# Patient Record
Sex: FEMALE | Race: WHITE | NOT HISPANIC OR LATINO | URBAN - METROPOLITAN AREA
[De-identification: names, ages, dates, MRNs, and addresses within clinical notes are randomized per-mention and may not be internally consistent; named-entity substitution may affect disease eponyms.]

---

## 2018-08-04 ENCOUNTER — EMERGENCY (EMERGENCY)
Facility: HOSPITAL | Age: 73
LOS: 0 days | Discharge: HOME | End: 2018-08-05
Attending: EMERGENCY MEDICINE | Admitting: EMERGENCY MEDICINE
Payer: MEDICARE

## 2018-08-04 VITALS
OXYGEN SATURATION: 100 % | RESPIRATION RATE: 19 BRPM | TEMPERATURE: 97 F | HEART RATE: 65 BPM | DIASTOLIC BLOOD PRESSURE: 78 MMHG | SYSTOLIC BLOOD PRESSURE: 121 MMHG

## 2018-08-04 VITALS
DIASTOLIC BLOOD PRESSURE: 65 MMHG | HEART RATE: 66 BPM | TEMPERATURE: 98 F | SYSTOLIC BLOOD PRESSURE: 127 MMHG | RESPIRATION RATE: 18 BRPM | OXYGEN SATURATION: 100 % | HEIGHT: 64 IN | WEIGHT: 128.97 LBS

## 2018-08-04 DIAGNOSIS — S09.90XA UNSPECIFIED INJURY OF HEAD, INITIAL ENCOUNTER: ICD-10-CM

## 2018-08-04 DIAGNOSIS — R55 SYNCOPE AND COLLAPSE: ICD-10-CM
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
## 2018-08-04 DIAGNOSIS — F10.129 ALCOHOL ABUSE WITH INTOXICATION, UNSPECIFIED: ICD-10-CM
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
## 2018-08-04 LAB
ALBUMIN SERPL ELPH-MCNC: 3.9 G/DL — SIGNIFICANT CHANGE UP (ref 3.3–5)
ALP SERPL-CCNC: 80 U/L — SIGNIFICANT CHANGE UP (ref 40–120)
ALT FLD-CCNC: 23 U/L — SIGNIFICANT CHANGE UP (ref 12–78)
ANION GAP SERPL CALC-SCNC: 8 MMOL/L — SIGNIFICANT CHANGE UP (ref 5–17)
AST SERPL-CCNC: 21 U/L — SIGNIFICANT CHANGE UP (ref 15–37)
BASOPHILS # BLD AUTO: 0.04 K/UL — SIGNIFICANT CHANGE UP (ref 0–0.2)
BASOPHILS NFR BLD AUTO: 0.6 % — SIGNIFICANT CHANGE UP (ref 0–2)
BILIRUB SERPL-MCNC: 0.4 MG/DL — SIGNIFICANT CHANGE UP (ref 0.2–1.2)
BUN SERPL-MCNC: 19 MG/DL — SIGNIFICANT CHANGE UP (ref 7–23)
CALCIUM SERPL-MCNC: 8.4 MG/DL — LOW (ref 8.5–10.1)
CHLORIDE SERPL-SCNC: 107 MMOL/L — SIGNIFICANT CHANGE UP (ref 96–108)
CK SERPL-CCNC: 109 U/L — SIGNIFICANT CHANGE UP (ref 26–192)
CO2 SERPL-SCNC: 27 MMOL/L — SIGNIFICANT CHANGE UP (ref 22–31)
CREAT SERPL-MCNC: 0.8 MG/DL — SIGNIFICANT CHANGE UP (ref 0.5–1.3)
EOSINOPHIL # BLD AUTO: 0.32 K/UL — SIGNIFICANT CHANGE UP (ref 0–0.5)
EOSINOPHIL NFR BLD AUTO: 4.5 % — SIGNIFICANT CHANGE UP (ref 0–6)
ETHANOL SERPL-MCNC: 196 MG/DL — HIGH (ref 0–10)
GLUCOSE SERPL-MCNC: 98 MG/DL — SIGNIFICANT CHANGE UP (ref 70–99)
HCT VFR BLD CALC: 40.1 % — SIGNIFICANT CHANGE UP (ref 34.5–45)
HGB BLD-MCNC: 13.6 G/DL — SIGNIFICANT CHANGE UP (ref 11.5–15.5)
IMM GRANULOCYTES NFR BLD AUTO: 0.6 % — SIGNIFICANT CHANGE UP (ref 0–1.5)
LYMPHOCYTES # BLD AUTO: 2.28 K/UL — SIGNIFICANT CHANGE UP (ref 1–3.3)
LYMPHOCYTES # BLD AUTO: 31.8 % — SIGNIFICANT CHANGE UP (ref 13–44)
MAGNESIUM SERPL-MCNC: 2.3 MG/DL — SIGNIFICANT CHANGE UP (ref 1.6–2.6)
MCHC RBC-ENTMCNC: 31.9 PG — SIGNIFICANT CHANGE UP (ref 27–34)
MCHC RBC-ENTMCNC: 33.9 GM/DL — SIGNIFICANT CHANGE UP (ref 32–36)
MCV RBC AUTO: 93.9 FL — SIGNIFICANT CHANGE UP (ref 80–100)
MONOCYTES # BLD AUTO: 0.61 K/UL — SIGNIFICANT CHANGE UP (ref 0–0.9)
MONOCYTES NFR BLD AUTO: 8.5 % — SIGNIFICANT CHANGE UP (ref 2–14)
NEUTROPHILS # BLD AUTO: 3.89 K/UL — SIGNIFICANT CHANGE UP (ref 1.8–7.4)
NEUTROPHILS NFR BLD AUTO: 54 % — SIGNIFICANT CHANGE UP (ref 43–77)
NRBC # BLD: 0 /100 WBCS — SIGNIFICANT CHANGE UP (ref 0–0)
PLATELET # BLD AUTO: 215 K/UL — SIGNIFICANT CHANGE UP (ref 150–400)
POTASSIUM SERPL-MCNC: 3.7 MMOL/L — SIGNIFICANT CHANGE UP (ref 3.5–5.3)
POTASSIUM SERPL-SCNC: 3.7 MMOL/L — SIGNIFICANT CHANGE UP (ref 3.5–5.3)
PROT SERPL-MCNC: 6.8 GM/DL — SIGNIFICANT CHANGE UP (ref 6–8.3)
RBC # BLD: 4.27 M/UL — SIGNIFICANT CHANGE UP (ref 3.8–5.2)
RBC # FLD: 12.7 % — SIGNIFICANT CHANGE UP (ref 10.3–14.5)
SODIUM SERPL-SCNC: 142 MMOL/L — SIGNIFICANT CHANGE UP (ref 135–145)
TROPONIN I SERPL-MCNC: <0.015 NG/ML — SIGNIFICANT CHANGE UP (ref 0.01–0.04)
TROPONIN I SERPL-MCNC: <0.015 NG/ML — SIGNIFICANT CHANGE UP (ref 0.01–0.04)
WBC # BLD: 7.18 K/UL — SIGNIFICANT CHANGE UP (ref 3.8–10.5)
WBC # FLD AUTO: 7.18 K/UL — SIGNIFICANT CHANGE UP (ref 3.8–10.5)

## 2018-08-04 PROCEDURE — 73000 X-RAY EXAM OF COLLAR BONE: CPT | Mod: 26,LT

## 2018-08-04 PROCEDURE — 93010 ELECTROCARDIOGRAM REPORT: CPT

## 2018-08-04 PROCEDURE — 99284 EMERGENCY DEPT VISIT MOD MDM: CPT

## 2018-08-04 PROCEDURE — 71045 X-RAY EXAM CHEST 1 VIEW: CPT | Mod: 26

## 2018-08-04 PROCEDURE — 73080 X-RAY EXAM OF ELBOW: CPT | Mod: 26,LT

## 2018-08-04 PROCEDURE — 73502 X-RAY EXAM HIP UNI 2-3 VIEWS: CPT | Mod: 26

## 2018-08-04 PROCEDURE — 70450 CT HEAD/BRAIN W/O DYE: CPT | Mod: 26

## 2018-08-04 RX ORDER — BACITRACIN ZINC 500 UNIT/G
1 OINTMENT IN PACKET (EA) TOPICAL ONCE
Qty: 0 | Refills: 0 | Status: COMPLETED | OUTPATIENT
Start: 2018-08-04 | End: 2018-08-04

## 2018-08-04 RX ORDER — SODIUM CHLORIDE 9 MG/ML
1000 INJECTION, SOLUTION INTRAVENOUS
Qty: 0 | Refills: 0 | Status: COMPLETED | OUTPATIENT
Start: 2018-08-04 | End: 2018-08-04

## 2018-08-04 RX ADMIN — Medication 1 APPLICATION(S): at 20:31

## 2018-08-04 RX ADMIN — SODIUM CHLORIDE 125 MILLILITER(S): 9 INJECTION, SOLUTION INTRAVENOUS at 22:01

## 2018-08-04 NOTE — ED PROVIDER NOTE - PROGRESS NOTE DETAILS
Lon Leyva for attending Dr. Walker: Pt c/o  pain over left collar bone. Chest XR is normal. Distal end of clavicle if not visualized. Plan to order dedicated XR of clavicle.

## 2018-08-04 NOTE — ED ADULT NURSE NOTE - CHPI ED NUR SYMPTOMS NEG
no decreased eating/drinking/no vomiting/no dizziness/no fever/no nausea/no weakness/no tingling/no chills

## 2018-08-04 NOTE — ED PROVIDER NOTE - MEDICAL DECISION MAKING DETAILS
74 y/o female c/o alcohol intoxication with syncope. Plan: labs, including 2 troponin, will get CT of head, XR of elbow, pelvis, hip. Bacitracin applied to elbow. Banana bag will be given for IV hydration.

## 2018-08-04 NOTE — ED ADULT TRIAGE NOTE - CHIEF COMPLAINT QUOTE
Patient states that she was at a party and had a "few drinks." Reports that while walking inside she "passed out." Reports mild pain to right hip, otherwise no complaints.

## 2018-08-04 NOTE — ED ADULT NURSE NOTE - OBJECTIVE STATEMENT
pt presented to ED for syncope. As per daughter at the bedside, pt was drinking at the party, was walking up the steps, then fell back drew. No recollection of falling down. C/O neck, left shoulder, and hip pain upon arrival.

## 2018-08-04 NOTE — ED PROVIDER NOTE - OBJECTIVE STATEMENT
74 y/o female with no relevant PMHx presents to the ED s/p syncope. As per family member, pt was drinking at a family party, was walking up the steps and fell backwards. Does not remember falling. +neck pain. +head injury. Denies n/v/d, HA, back pain, numbness and tingling.

## 2018-08-04 NOTE — ED ADULT NURSE NOTE - NSIMPLEMENTINTERV_GEN_ALL_ED
Implemented All Universal Safety Interventions:  [place] to call system. Call bell, personal items and telephone within reach. Instruct patient to call for assistance. Room bathroom lighting operational. Non-slip footwear when patient is off stretcher. Physically safe environment: no spills, clutter or unnecessary equipment. Stretcher in lowest position, wheels locked, appropriate side rails in place.

## 2018-08-04 NOTE — ED PROVIDER NOTE - CARE PLAN
Principal Discharge DX:	Syncope and collapse  Secondary Diagnosis:	Closed head injury, initial encounter  Secondary Diagnosis:	Alcoholic intoxication without complication

## 2018-10-03 PROBLEM — Z00.00 ENCOUNTER FOR PREVENTIVE HEALTH EXAMINATION: Status: ACTIVE | Noted: 2018-10-03

## 2018-10-05 ENCOUNTER — APPOINTMENT (OUTPATIENT)
Dept: ORTHOPEDIC SURGERY | Facility: CLINIC | Age: 73
End: 2018-10-05
Payer: MEDICARE

## 2018-10-05 VITALS
HEIGHT: 64 IN | HEART RATE: 69 BPM | BODY MASS INDEX: 23.05 KG/M2 | DIASTOLIC BLOOD PRESSURE: 78 MMHG | SYSTOLIC BLOOD PRESSURE: 183 MMHG | WEIGHT: 135 LBS

## 2018-10-05 DIAGNOSIS — Z78.9 OTHER SPECIFIED HEALTH STATUS: ICD-10-CM

## 2018-10-05 DIAGNOSIS — M47.816 SPONDYLOSIS W/OUT MYELOPATHY OR RADICULOPATHY, LUMBAR REGION: ICD-10-CM

## 2018-10-05 DIAGNOSIS — F17.200 NICOTINE DEPENDENCE, UNSPECIFIED, UNCOMPLICATED: ICD-10-CM

## 2018-10-05 DIAGNOSIS — M16.11 UNILATERAL PRIMARY OSTEOARTHRITIS, RIGHT HIP: ICD-10-CM

## 2018-10-05 PROCEDURE — 72100 X-RAY EXAM L-S SPINE 2/3 VWS: CPT

## 2018-10-05 PROCEDURE — 73502 X-RAY EXAM HIP UNI 2-3 VIEWS: CPT | Mod: RT

## 2018-10-05 PROCEDURE — 99205 OFFICE O/P NEW HI 60 MIN: CPT

## 2018-10-16 PROBLEM — M47.816 DJD (DEGENERATIVE JOINT DISEASE), LUMBAR: Status: ACTIVE | Noted: 2018-10-16

## 2018-10-16 PROBLEM — F17.200 CURRENT EVERY DAY SMOKER: Status: ACTIVE | Noted: 2018-10-05

## 2018-10-16 PROBLEM — Z78.9 EXERCISES OCCASIONALLY: Status: ACTIVE | Noted: 2018-10-05

## 2018-10-16 PROBLEM — Z78.9 ALCOHOL USE: Status: ACTIVE | Noted: 2018-10-05

## 2018-10-16 PROBLEM — M16.11 PRIMARY LOCALIZED OSTEOARTHRITIS OF RIGHT HIP: Status: ACTIVE | Noted: 2018-10-16

## 2018-10-16 RX ORDER — MULTIVIT-MIN/IRON/FOLIC ACID/K 18-600-40
CAPSULE ORAL
Refills: 0 | Status: ACTIVE | COMMUNITY

## 2019-02-16 ENCOUNTER — EMERGENCY (EMERGENCY)
Facility: HOSPITAL | Age: 74
LOS: 0 days | Discharge: HOME | End: 2019-02-16
Attending: STUDENT IN AN ORGANIZED HEALTH CARE EDUCATION/TRAINING PROGRAM | Admitting: STUDENT IN AN ORGANIZED HEALTH CARE EDUCATION/TRAINING PROGRAM
Payer: MEDICARE

## 2019-02-16 VITALS
OXYGEN SATURATION: 100 % | SYSTOLIC BLOOD PRESSURE: 150 MMHG | TEMPERATURE: 99 F | DIASTOLIC BLOOD PRESSURE: 77 MMHG | RESPIRATION RATE: 15 BRPM | HEART RATE: 64 BPM

## 2019-02-16 VITALS — HEIGHT: 64 IN | WEIGHT: 134.92 LBS

## 2019-02-16 DIAGNOSIS — S52.502A UNSPECIFIED FRACTURE OF THE LOWER END OF LEFT RADIUS, INITIAL ENCOUNTER FOR CLOSED FRACTURE: ICD-10-CM
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
## 2019-02-16 DIAGNOSIS — Z88.0 ALLERGY STATUS TO PENICILLIN: ICD-10-CM

## 2019-02-16 DIAGNOSIS — M25.532 PAIN IN LEFT WRIST: ICD-10-CM

## 2019-02-16 PROCEDURE — 99283 EMERGENCY DEPT VISIT LOW MDM: CPT | Mod: 25

## 2019-02-16 PROCEDURE — 73110 X-RAY EXAM OF WRIST: CPT | Mod: 26,LT

## 2019-02-16 NOTE — ED ADULT NURSE NOTE - OBJECTIVE STATEMENT
c/o left arm pain, s/p left arm fracture and was reset 3 weeks ago while pt had repair of right femur fracture, left arm in cast HX: 2 falls,

## 2019-02-16 NOTE — ED PROVIDER NOTE - OBJECTIVE STATEMENT
73 y/o female with no pertinent PMHx presents to the ED s/p mechanical fall on January 21st c/o left arm pain. Had right hip replacement at [facility] on January 14th and went home the next day with a walker. A few days after the surgery, pt was holding a shower rail as she entered the bathtub when the rail broke and fell cracking her femur. Pt then fell again in the kitchen on January 21st and broke her left arm. Pt's son in law spoke with Dr. Schrader, who told the pt to come to the hospital and Dr. Schrader will come and see her. 75 y/o female with no pertinent PMHx presents to the ED s/p mechanical fall on January 21st c/o left arm pain. Had right hip replacement at [facility] on January 14th and went home the next day with a walker. A few days after the surgery, pt was holding a shower rail as she entered the bathtub when the rail broke and she fell, cracking her femur. Pt then fell again in the kitchen on January 21st and broke her left arm. Pt's son in law spoke with Dr. Schrader, who told the pt to come to the hospital and Dr. Schrader will come and see her.

## 2019-02-16 NOTE — ED PROVIDER NOTE - MUSCULOSKELETAL, MLM
Spine appears normal, range of motion is not limited, no muscle or joint tenderness. +LUE in splint, good cap refill.

## 2019-02-16 NOTE — ED ADULT TRIAGE NOTE - CHIEF COMPLAINT QUOTE
patient brought in by  to meet Dr. Rowland. patient had left arm injury 10 days ago and is having pain to arm today. arm in splint.

## 2021-05-10 ENCOUNTER — APPOINTMENT (OUTPATIENT)
Dept: CT IMAGING | Facility: CLINIC | Age: 76
End: 2021-05-10
Payer: MEDICARE

## 2021-05-10 ENCOUNTER — TRANSCRIPTION ENCOUNTER (OUTPATIENT)
Age: 76
End: 2021-05-10

## 2021-05-10 ENCOUNTER — OUTPATIENT (OUTPATIENT)
Dept: OUTPATIENT SERVICES | Facility: HOSPITAL | Age: 76
LOS: 1 days | End: 2021-05-10
Payer: MEDICARE

## 2021-05-10 DIAGNOSIS — S09.90XA UNSPECIFIED INJURY OF HEAD, INITIAL ENCOUNTER: ICD-10-CM

## 2021-05-10 PROCEDURE — 70450 CT HEAD/BRAIN W/O DYE: CPT | Mod: 26

## 2021-05-10 PROCEDURE — 70450 CT HEAD/BRAIN W/O DYE: CPT

## 2021-08-14 ENCOUNTER — TRANSCRIPTION ENCOUNTER (OUTPATIENT)
Age: 76
End: 2021-08-14

## 2021-08-16 ENCOUNTER — APPOINTMENT (OUTPATIENT)
Dept: DERMATOLOGY | Facility: CLINIC | Age: 76
End: 2021-08-16
Payer: MEDICARE

## 2021-08-16 ENCOUNTER — NON-APPOINTMENT (OUTPATIENT)
Age: 76
End: 2021-08-16

## 2021-08-16 PROCEDURE — 99203 OFFICE O/P NEW LOW 30 MIN: CPT

## 2021-08-16 RX ORDER — SILVER SULFADIAZINE 10 MG/G
1 CREAM TOPICAL
Qty: 1 | Refills: 3 | Status: ACTIVE | COMMUNITY
Start: 2021-08-16 | End: 1900-01-01

## 2021-08-16 RX ORDER — MULTIVIT-MIN/FA/LYCOPEN/LUTEIN .4-300-25
TABLET ORAL
Refills: 0 | Status: ACTIVE | COMMUNITY
Start: 2021-08-16

## 2021-08-16 RX ORDER — ANTIARTHRITIC COMBINATION NO.2 900 MG
5000 TABLET ORAL
Refills: 0 | Status: ACTIVE | COMMUNITY
Start: 2021-08-16

## 2021-08-16 RX ORDER — CYANOCOBALAMIN (VITAMIN B-12) 100 MCG
100 TABLET ORAL
Refills: 0 | Status: ACTIVE | COMMUNITY
Start: 2021-08-16

## 2021-08-16 RX ORDER — SERTRALINE HYDROCHLORIDE 100 MG/1
100 TABLET, FILM COATED ORAL
Refills: 0 | Status: ACTIVE | COMMUNITY
Start: 2021-08-16

## 2021-08-16 NOTE — ASSESSMENT
[FreeTextEntry1] : Erosions from external stimuli - ? etiology\par Silvadene cream bid, with dressings.\par Discussed at length with patient.\par f/u in 2 weeks.

## 2021-08-16 NOTE — PHYSICAL EXAM
[Alert] : alert [Oriented x 3] : ~L oriented x 3 [Well Nourished] : well nourished [FreeTextEntry3] : Triangular and square erosions on the right extensor forearm.  Very superficial abrasion of the left knee.

## 2021-08-16 NOTE — HISTORY OF PRESENT ILLNESS
[FreeTextEntry1] : Ulcers of the right forearm, left knee. [de-identified] : Awoke this AM with lesions, and patient sure that it wasn't there last PM.  Denies burn.

## 2021-08-30 ENCOUNTER — APPOINTMENT (OUTPATIENT)
Dept: DERMATOLOGY | Facility: CLINIC | Age: 76
End: 2021-08-30

## 2021-09-02 ENCOUNTER — APPOINTMENT (OUTPATIENT)
Dept: DERMATOLOGY | Facility: CLINIC | Age: 76
End: 2021-09-02
Payer: MEDICARE

## 2021-09-02 DIAGNOSIS — L81.0 POSTINFLAMMATORY HYPERPIGMENTATION: ICD-10-CM

## 2021-09-02 DIAGNOSIS — L98.1 FACTITIAL DERMATITIS: ICD-10-CM

## 2021-09-02 PROCEDURE — 99213 OFFICE O/P EST LOW 20 MIN: CPT

## 2021-09-02 NOTE — HISTORY OF PRESENT ILLNESS
[FreeTextEntry1] : Check the right forearm. [de-identified] : ulcer has healed s/p tx with silvadene cream.\par Patient also with dark lesions of the face, s/p fall.

## 2021-09-02 NOTE — ASSESSMENT
[FreeTextEntry1] : Cicatrix\par message with thick emollient creams.\par Education.\par \par Lentigines / P.I. hyperpigmentation.\par Discussed IPL tx if desires.\par Risks/benefits discussed.\par Cosmetic at [facility]/tx for 1-3+ txs as desired.

## 2021-09-02 NOTE — PHYSICAL EXAM
[Alert] : alert [Oriented x 3] : ~L oriented x 3 [Well Nourished] : well nourished [FreeTextEntry3] : Erythematous geometric cicatrix, left forearm.\par \par lentigines, face.\par P.I. hyperpigmentation of the nose, left malar.

## 2021-10-01 ENCOUNTER — APPOINTMENT (OUTPATIENT)
Dept: NEUROLOGY | Facility: CLINIC | Age: 76
End: 2021-10-01
Payer: MEDICARE

## 2021-10-01 PROCEDURE — 96132 NRPSYC TST EVAL PHYS/QHP 1ST: CPT

## 2021-10-01 PROCEDURE — 96116 NUBHVL XM PHYS/QHP 1ST HR: CPT

## 2021-10-01 PROCEDURE — 96121 NUBHVL XM PHY/QHP EA ADDL HR: CPT

## 2021-10-08 ENCOUNTER — APPOINTMENT (OUTPATIENT)
Dept: NEUROLOGY | Facility: CLINIC | Age: 76
End: 2021-10-08
Payer: MEDICARE

## 2021-10-08 PROCEDURE — 96138 PSYCL/NRPSYC TECH 1ST: CPT

## 2021-10-08 PROCEDURE — 96133 NRPSYC TST EVAL PHYS/QHP EA: CPT

## 2021-10-08 PROCEDURE — 96139 PSYCL/NRPSYC TST TECH EA: CPT

## 2021-10-19 ENCOUNTER — APPOINTMENT (OUTPATIENT)
Dept: NEUROLOGY | Facility: CLINIC | Age: 76
End: 2021-10-19
Payer: MEDICARE

## 2021-10-19 PROCEDURE — 96139 PSYCL/NRPSYC TST TECH EA: CPT

## 2021-10-19 PROCEDURE — 96133 NRPSYC TST EVAL PHYS/QHP EA: CPT

## 2021-11-03 ENCOUNTER — APPOINTMENT (OUTPATIENT)
Dept: NEUROLOGY | Facility: CLINIC | Age: 76
End: 2021-11-03
Payer: MEDICARE

## 2021-11-03 PROCEDURE — 96132 NRPSYC TST EVAL PHYS/QHP 1ST: CPT | Mod: 95

## 2022-11-17 ENCOUNTER — APPOINTMENT (OUTPATIENT)
Dept: DERMATOLOGY | Facility: CLINIC | Age: 77
End: 2022-11-17

## 2022-11-17 DIAGNOSIS — L90.5 SCAR CONDITIONS AND FIBROSIS OF SKIN: ICD-10-CM

## 2022-11-17 PROCEDURE — 99213 OFFICE O/P EST LOW 20 MIN: CPT

## 2022-11-17 RX ORDER — MUPIROCIN 20 MG/G
2 OINTMENT TOPICAL
Qty: 22 | Refills: 0 | Status: ACTIVE | COMMUNITY
Start: 2022-10-17

## 2022-11-17 RX ORDER — SODIUM SULFATE, POTASSIUM SULFATE, MAGNESIUM SULFATE 17.5; 3.13; 1.6 G/ML; G/ML; G/ML
17.5-3.13-1.6 SOLUTION, CONCENTRATE ORAL
Qty: 354 | Refills: 0 | Status: ACTIVE | COMMUNITY
Start: 2022-06-01

## 2022-11-17 RX ORDER — AMLODIPINE BESYLATE AND BENAZEPRIL HYDROCHLORIDE 5; 10 MG/1; MG/1
5-10 CAPSULE ORAL
Refills: 0 | Status: DISCONTINUED | COMMUNITY
Start: 2021-08-16 | End: 2022-11-17

## 2022-11-17 RX ORDER — DONEPEZIL HYDROCHLORIDE 10 MG/1
10 TABLET ORAL
Qty: 90 | Refills: 0 | Status: ACTIVE | COMMUNITY
Start: 2022-01-25

## 2022-11-17 RX ORDER — SIMVASTATIN 10 MG/1
10 TABLET, FILM COATED ORAL
Refills: 0 | Status: DISCONTINUED | COMMUNITY
Start: 2021-08-16 | End: 2022-11-17

## 2022-11-17 RX ORDER — OLMESARTAN MEDOXOMIL, AMLODIPINE BESYLATE AND HYDROCHLOROTHIAZIDE 40; 10; 12.5 MG/1; MG/1; MG/1
40-5-25 TABLET, FILM COATED ORAL
Qty: 30 | Refills: 0 | Status: ACTIVE | COMMUNITY
Start: 2022-01-19

## 2022-11-17 RX ORDER — METHYLPREDNISOLONE 4 MG/1
4 TABLET ORAL
Qty: 21 | Refills: 0 | Status: DISCONTINUED | COMMUNITY
Start: 2022-05-20

## 2022-11-17 RX ORDER — SERTRALINE HYDROCHLORIDE 50 MG/1
50 TABLET, FILM COATED ORAL
Refills: 0 | Status: DISCONTINUED | COMMUNITY
Start: 2021-08-16 | End: 2022-11-17

## 2022-11-17 RX ORDER — DOXYCYCLINE HYCLATE 100 MG/1
100 TABLET ORAL
Qty: 14 | Refills: 0 | Status: DISCONTINUED | COMMUNITY
Start: 2022-05-20

## 2022-11-17 RX ORDER — AMOXICILLIN 500 MG/1
500 CAPSULE ORAL
Qty: 28 | Refills: 0 | Status: DISCONTINUED | COMMUNITY
Start: 2021-08-27 | End: 2022-11-17

## 2022-11-17 RX ORDER — ROSUVASTATIN CALCIUM 10 MG/1
10 TABLET, FILM COATED ORAL
Qty: 90 | Refills: 0 | Status: ACTIVE | COMMUNITY
Start: 2022-08-11

## 2022-11-17 NOTE — PHYSICAL EXAM
[Alert] : alert [Oriented x 3] : ~L oriented x 3 [Well Nourished] : well nourished [FreeTextEntry3] : Erythematous indurated patch of the forehead, with depigmented indurated patch with peripheral erythema, nasal bridge.

## 2022-11-17 NOTE — HISTORY OF PRESENT ILLNESS
[FreeTextEntry1] : Marks on the face. [de-identified] : Patient fell approx. 2 months ago, with injury to the nose and forehead.\par She had x-rays and w/u at hospital at the time of injury.

## 2022-11-17 NOTE — ASSESSMENT
[FreeTextEntry1] : Cicatrix\par Education.\par Mederma cream qid.\par Message to the region discussed.\par f/u prn.

## 2023-01-24 ENCOUNTER — APPOINTMENT (OUTPATIENT)
Dept: DERMATOLOGY | Facility: CLINIC | Age: 78
End: 2023-01-24
Payer: MEDICARE

## 2023-01-24 DIAGNOSIS — L72.3 SEBACEOUS CYST: ICD-10-CM

## 2023-01-24 PROCEDURE — 99214 OFFICE O/P EST MOD 30 MIN: CPT

## 2023-01-24 NOTE — PHYSICAL EXAM
[FreeTextEntry3] : Mobile subcutaneous nodule with normal overlying skin and a connecting pore \par + evidence of recent rupture; \par mid posterior scalp

## 2023-01-24 NOTE — HISTORY OF PRESENT ILLNESS
[de-identified] : The patient has been fit in for urgent appointment.\par c/o painful lesion on scalp;  oozed, turned red; \par

## 2023-01-24 NOTE — ASSESSMENT
[FreeTextEntry1] : ruptured pilar cyst; \par \par Therapeutic options and their risks and benefits; along with multiple diagnostic possibilities were discussed at length; risks and benefits of further study were discussed;\par \par f/u for surgical excision\par \par The risks and benefits of the procedure, were explained at length including pain; scar, bleeding; recurrence of lesion(s); need for wound care;\par \par

## 2023-02-07 ENCOUNTER — APPOINTMENT (OUTPATIENT)
Dept: DERMATOLOGY | Facility: CLINIC | Age: 78
End: 2023-02-07

## 2023-04-26 ENCOUNTER — APPOINTMENT (OUTPATIENT)
Dept: DERMATOLOGY | Facility: CLINIC | Age: 78
End: 2023-04-26
Payer: MEDICARE

## 2023-04-26 DIAGNOSIS — D48.5 NEOPLASM OF UNCERTAIN BEHAVIOR OF SKIN: ICD-10-CM

## 2023-04-26 PROCEDURE — 12032 INTMD RPR S/A/T/EXT 2.6-7.5: CPT

## 2023-04-26 PROCEDURE — 11403 EXC TR-EXT B9+MARG 2.1-3CM: CPT

## 2023-04-26 NOTE — PROCEDURE
[___ cm] : [unfilled] cm [___ ml of 1% lidocaine w/ 1:100,000 epinephrine] : [unfilled] ml of 1% lidocaine with 1:100,000 epinephrine [Pressure] : pressure [Suture tie off of vessels] : suture tie off of vessels [____ cm] : [unfilled] cm [4-0] : 4-0 Prolene [____ days] : [unfilled] days [FreeTextEntry7] : Left posterior scalp [TWNoteComboBox1] : Epidermal Inclusion Cyst [TWNoteComboBox4] : Epidermal Inclusion Cyst [TWNoteComboBox5] : Subcutaneous fat

## 2023-05-03 ENCOUNTER — APPOINTMENT (OUTPATIENT)
Dept: DERMATOLOGY | Facility: CLINIC | Age: 78
End: 2023-05-03
Payer: MEDICARE

## 2023-05-03 PROCEDURE — 99024 POSTOP FOLLOW-UP VISIT: CPT

## 2023-08-31 ENCOUNTER — APPOINTMENT (OUTPATIENT)
Dept: DERMATOLOGY | Facility: CLINIC | Age: 78
End: 2023-08-31
Payer: MEDICARE

## 2023-08-31 DIAGNOSIS — L81.4 OTHER MELANIN HYPERPIGMENTATION: ICD-10-CM

## 2023-08-31 DIAGNOSIS — L82.1 OTHER SEBORRHEIC KERATOSIS: ICD-10-CM

## 2023-08-31 DIAGNOSIS — Z12.83 ENCOUNTER FOR SCREENING FOR MALIGNANT NEOPLASM OF SKIN: ICD-10-CM

## 2023-08-31 PROCEDURE — 99213 OFFICE O/P EST LOW 20 MIN: CPT

## 2023-08-31 NOTE — HISTORY OF PRESENT ILLNESS
[FreeTextEntry1] : spots [de-identified] : RENATO GÓMEZ is a 78 year old F who present for f/u as below.   FBSE.  Spots scattered on body x years. Asymptomatic and unchanged. No alleviating/aggravating factors. Never been treated. No other changing or concerning lesions.  No itchy, growing, bleeding, painful or changing moles.   Personal hx of skin cancer: no FHx of skin cancer: no Social hx:  to Josh Gómez

## 2023-08-31 NOTE — PHYSICAL EXAM
[Alert] : alert [Oriented x 3] : ~L oriented x 3 [Well Nourished] : well nourished [Conjunctiva Non-injected] : conjunctiva non-injected [No Visual Lymphadenopathy] : no visual  lymphadenopathy [No Clubbing] : no clubbing [No Edema] : no edema [No Bromhidrosis] : no bromhidrosis [No Chromhidrosis] : no chromhidrosis [Full Body Skin Exam Performed] : performed [FreeTextEntry3] : General: Alert and oriented, in NAD.  All of the following were examined and were within normal limits, except as noted:   Scalp: Face, including eyelids, nose, lips, ears, oropharynx: Neck: Chest/Back/Abdomen: Bilateral Arms/Hands: Bilateral Legs/Feet: Hair, Nails, Oral Mucosa, Eyes:   Pt declined genital exam - lentigines - stuckon waxy brown papules - spider veins thighs

## 2023-08-31 NOTE — ASSESSMENT
[FreeTextEntry1] : Screening exam for skin cancer - no suspicious lesions on exam today - TBSE performed today except genitalia - Advised sun protection.  Recommended OTC sunscreen products (EltaMD/Neutrogena/La Roche Posay), including SPF30+ with broadband UV protection as well as proper use.  Discussed OTC sun protective clothing - Counseled patient to monitor for changes, including mole monitoring and self-skin exams  Seborrheic Keratosis - These growths are benign - Related to genetics - these lesions run in families; NOT related to sun exposure - No treatment warranted unless inflamed; can use OTC Sarna lotion PRN itch  Solar lentigines - Discussed etiology and benign nature of condition - Sun protective measures reinforced. Recommended OTC sunscreen products, including SPF30+ with broadband UV protection as well as proper use.

## 2024-01-03 ENCOUNTER — NON-APPOINTMENT (OUTPATIENT)
Age: 79
End: 2024-01-03

## 2024-12-03 ENCOUNTER — APPOINTMENT (OUTPATIENT)
Dept: DERMATOLOGY | Facility: CLINIC | Age: 79
End: 2024-12-03

## 2024-12-25 PROBLEM — F10.90 ALCOHOL USE: Status: ACTIVE | Noted: 2018-10-05

## 2025-01-24 ENCOUNTER — INPATIENT (INPATIENT)
Facility: HOSPITAL | Age: 80
LOS: 5 days | Discharge: NURSING, REHAB OR LONG-TERM CARE | DRG: 552 | End: 2025-01-30
Attending: FAMILY MEDICINE | Admitting: STUDENT IN AN ORGANIZED HEALTH CARE EDUCATION/TRAINING PROGRAM
Payer: MEDICARE

## 2025-01-24 VITALS
HEIGHT: 63 IN | HEART RATE: 79 BPM | RESPIRATION RATE: 16 BRPM | WEIGHT: 139.99 LBS | OXYGEN SATURATION: 99 % | SYSTOLIC BLOOD PRESSURE: 125 MMHG | DIASTOLIC BLOOD PRESSURE: 55 MMHG | TEMPERATURE: 98 F

## 2025-01-24 DIAGNOSIS — R55 SYNCOPE AND COLLAPSE: ICD-10-CM

## 2025-01-24 LAB
ALBUMIN SERPL ELPH-MCNC: 3.6 G/DL — SIGNIFICANT CHANGE UP (ref 3.3–5)
ALP SERPL-CCNC: 84 U/L — SIGNIFICANT CHANGE UP (ref 40–120)
ALT FLD-CCNC: 18 U/L — SIGNIFICANT CHANGE UP (ref 12–78)
ANION GAP SERPL CALC-SCNC: 6 MMOL/L — SIGNIFICANT CHANGE UP (ref 5–17)
AST SERPL-CCNC: 29 U/L — SIGNIFICANT CHANGE UP (ref 15–37)
BASOPHILS # BLD AUTO: 0.03 K/UL — SIGNIFICANT CHANGE UP (ref 0–0.2)
BASOPHILS NFR BLD AUTO: 0.2 % — SIGNIFICANT CHANGE UP (ref 0–2)
BILIRUB SERPL-MCNC: 0.7 MG/DL — SIGNIFICANT CHANGE UP (ref 0.2–1.2)
BUN SERPL-MCNC: 51 MG/DL — HIGH (ref 7–23)
CALCIUM SERPL-MCNC: 9.5 MG/DL — SIGNIFICANT CHANGE UP (ref 8.5–10.1)
CHLORIDE SERPL-SCNC: 102 MMOL/L — SIGNIFICANT CHANGE UP (ref 96–108)
CO2 SERPL-SCNC: 25 MMOL/L — SIGNIFICANT CHANGE UP (ref 22–31)
CREAT SERPL-MCNC: 1.87 MG/DL — HIGH (ref 0.5–1.3)
EGFR: 27 ML/MIN/1.73M2 — LOW
EOSINOPHIL # BLD AUTO: 0.03 K/UL — SIGNIFICANT CHANGE UP (ref 0–0.5)
EOSINOPHIL NFR BLD AUTO: 0.2 % — SIGNIFICANT CHANGE UP (ref 0–6)
ETHANOL SERPL-MCNC: <10 MG/DL — SIGNIFICANT CHANGE UP (ref 0–10)
FLUAV AG NPH QL: SIGNIFICANT CHANGE UP
FLUBV AG NPH QL: SIGNIFICANT CHANGE UP
GLUCOSE SERPL-MCNC: 128 MG/DL — HIGH (ref 70–99)
HCT VFR BLD CALC: 35.7 % — SIGNIFICANT CHANGE UP (ref 34.5–45)
HGB BLD-MCNC: 12.6 G/DL — SIGNIFICANT CHANGE UP (ref 11.5–15.5)
IMM GRANULOCYTES NFR BLD AUTO: 1.4 % — HIGH (ref 0–0.9)
LACTATE SERPL-SCNC: 1.3 MMOL/L — SIGNIFICANT CHANGE UP (ref 0.7–2)
LYMPHOCYTES # BLD AUTO: 1.08 K/UL — SIGNIFICANT CHANGE UP (ref 1–3.3)
LYMPHOCYTES # BLD AUTO: 8.6 % — LOW (ref 13–44)
MCHC RBC-ENTMCNC: 31.7 PG — SIGNIFICANT CHANGE UP (ref 27–34)
MCHC RBC-ENTMCNC: 35.3 G/DL — SIGNIFICANT CHANGE UP (ref 32–36)
MCV RBC AUTO: 89.9 FL — SIGNIFICANT CHANGE UP (ref 80–100)
MONOCYTES # BLD AUTO: 0.91 K/UL — HIGH (ref 0–0.9)
MONOCYTES NFR BLD AUTO: 7.3 % — SIGNIFICANT CHANGE UP (ref 2–14)
NEUTROPHILS # BLD AUTO: 10.32 K/UL — HIGH (ref 1.8–7.4)
NEUTROPHILS NFR BLD AUTO: 82.3 % — HIGH (ref 43–77)
PLATELET # BLD AUTO: 264 K/UL — SIGNIFICANT CHANGE UP (ref 150–400)
POTASSIUM SERPL-MCNC: 4.5 MMOL/L — SIGNIFICANT CHANGE UP (ref 3.5–5.3)
POTASSIUM SERPL-SCNC: 4.5 MMOL/L — SIGNIFICANT CHANGE UP (ref 3.5–5.3)
PROT SERPL-MCNC: 7.7 GM/DL — SIGNIFICANT CHANGE UP (ref 6–8.3)
RBC # BLD: 3.97 M/UL — SIGNIFICANT CHANGE UP (ref 3.8–5.2)
RBC # FLD: 12.2 % — SIGNIFICANT CHANGE UP (ref 10.3–14.5)
RSV RNA NPH QL NAA+NON-PROBE: SIGNIFICANT CHANGE UP
SARS-COV-2 RNA SPEC QL NAA+PROBE: SIGNIFICANT CHANGE UP
SODIUM SERPL-SCNC: 133 MMOL/L — LOW (ref 135–145)
TROPONIN I, HIGH SENSITIVITY RESULT: 7.63 NG/L — SIGNIFICANT CHANGE UP
WBC # BLD: 12.55 K/UL — HIGH (ref 3.8–10.5)
WBC # FLD AUTO: 12.55 K/UL — HIGH (ref 3.8–10.5)

## 2025-01-24 PROCEDURE — 93306 TTE W/DOPPLER COMPLETE: CPT

## 2025-01-24 PROCEDURE — 73502 X-RAY EXAM HIP UNI 2-3 VIEWS: CPT | Mod: 26,RT

## 2025-01-24 PROCEDURE — 83935 ASSAY OF URINE OSMOLALITY: CPT

## 2025-01-24 PROCEDURE — 84156 ASSAY OF PROTEIN URINE: CPT

## 2025-01-24 PROCEDURE — 36415 COLL VENOUS BLD VENIPUNCTURE: CPT

## 2025-01-24 PROCEDURE — 80048 BASIC METABOLIC PNL TOTAL CA: CPT

## 2025-01-24 PROCEDURE — 72125 CT NECK SPINE W/O DYE: CPT | Mod: 26

## 2025-01-24 PROCEDURE — 76376 3D RENDER W/INTRP POSTPROCES: CPT | Mod: 26

## 2025-01-24 PROCEDURE — 84133 ASSAY OF URINE POTASSIUM: CPT

## 2025-01-24 PROCEDURE — 84300 ASSAY OF URINE SODIUM: CPT

## 2025-01-24 PROCEDURE — 97163 PT EVAL HIGH COMPLEX 45 MIN: CPT | Mod: GP

## 2025-01-24 PROCEDURE — 99223 1ST HOSP IP/OBS HIGH 75: CPT

## 2025-01-24 PROCEDURE — 71250 CT THORAX DX C-: CPT | Mod: MC

## 2025-01-24 PROCEDURE — 71250 CT THORAX DX C-: CPT | Mod: 26

## 2025-01-24 PROCEDURE — 82570 ASSAY OF URINE CREATININE: CPT

## 2025-01-24 PROCEDURE — 87086 URINE CULTURE/COLONY COUNT: CPT

## 2025-01-24 PROCEDURE — 97116 GAIT TRAINING THERAPY: CPT | Mod: GP

## 2025-01-24 PROCEDURE — 81001 URINALYSIS AUTO W/SCOPE: CPT

## 2025-01-24 PROCEDURE — 97530 THERAPEUTIC ACTIVITIES: CPT | Mod: GP

## 2025-01-24 PROCEDURE — 99285 EMERGENCY DEPT VISIT HI MDM: CPT

## 2025-01-24 PROCEDURE — 70450 CT HEAD/BRAIN W/O DYE: CPT | Mod: 26

## 2025-01-24 PROCEDURE — 73552 X-RAY EXAM OF FEMUR 2/>: CPT | Mod: 26,RT

## 2025-01-24 PROCEDURE — 71045 X-RAY EXAM CHEST 1 VIEW: CPT | Mod: 26

## 2025-01-24 PROCEDURE — 85027 COMPLETE CBC AUTOMATED: CPT

## 2025-01-24 PROCEDURE — 84540 ASSAY OF URINE/UREA-N: CPT

## 2025-01-24 PROCEDURE — 93010 ELECTROCARDIOGRAM REPORT: CPT

## 2025-01-24 PROCEDURE — 72192 CT PELVIS W/O DYE: CPT | Mod: 26

## 2025-01-24 RX ORDER — DONEPEZIL HYDROCHLORIDE 10 MG/1
1 TABLET, FILM COATED ORAL
Refills: 0 | DISCHARGE

## 2025-01-24 RX ORDER — ACETAMINOPHEN, DIPHENHYDRAMINE HCL, PHENYLEPHRINE HCL 325; 25; 5 MG/1; MG/1; MG/1
3 TABLET ORAL AT BEDTIME
Refills: 0 | Status: DISCONTINUED | OUTPATIENT
Start: 2025-01-24 | End: 2025-01-30

## 2025-01-24 RX ORDER — IPRATROPIUM BROMIDE AND ALBUTEROL SULFATE .5; 2.5 MG/3ML; MG/3ML
3 SOLUTION RESPIRATORY (INHALATION) EVERY 6 HOURS
Refills: 0 | Status: DISCONTINUED | OUTPATIENT
Start: 2025-01-24 | End: 2025-01-30

## 2025-01-24 RX ORDER — BACTERIOSTATIC SODIUM CHLORIDE 0.9 %
1000 VIAL (ML) INJECTION ONCE
Refills: 0 | Status: COMPLETED | OUTPATIENT
Start: 2025-01-24 | End: 2025-01-24

## 2025-01-24 RX ORDER — ACETAMINOPHEN 160 MG/5ML
650 SUSPENSION ORAL ONCE
Refills: 0 | Status: COMPLETED | OUTPATIENT
Start: 2025-01-24 | End: 2025-01-24

## 2025-01-24 RX ORDER — ONDANSETRON 4 MG/1
4 TABLET, ORALLY DISINTEGRATING ORAL EVERY 8 HOURS
Refills: 0 | Status: DISCONTINUED | OUTPATIENT
Start: 2025-01-24 | End: 2025-01-30

## 2025-01-24 RX ORDER — HEPARIN SODIUM,PORCINE 10000/ML
5000 VIAL (ML) INJECTION EVERY 12 HOURS
Refills: 0 | Status: DISCONTINUED | OUTPATIENT
Start: 2025-01-24 | End: 2025-01-30

## 2025-01-24 RX ORDER — ACETAMINOPHEN 160 MG/5ML
650 SUSPENSION ORAL EVERY 6 HOURS
Refills: 0 | Status: DISCONTINUED | OUTPATIENT
Start: 2025-01-24 | End: 2025-01-30

## 2025-01-24 RX ORDER — ROSUVASTATIN CALCIUM 10 MG/1
1 TABLET, FILM COATED ORAL
Refills: 0 | DISCHARGE

## 2025-01-24 RX ORDER — QUETIAPINE FUMARATE 300 MG/1
1 TABLET ORAL
Refills: 0 | DISCHARGE

## 2025-01-24 RX ORDER — AMLODIPINE BESYLATE 5 MG
1 TABLET ORAL
Refills: 0 | DISCHARGE

## 2025-01-24 RX ORDER — BUPROPION HYDROCHLORIDE 150 MG/1
1 TABLET, EXTENDED RELEASE ORAL
Refills: 0 | DISCHARGE

## 2025-01-24 RX ORDER — MAGNESIUM, ALUMINUM HYDROXIDE 200-225/5
30 SUSPENSION, ORAL (FINAL DOSE FORM) ORAL EVERY 4 HOURS
Refills: 0 | Status: DISCONTINUED | OUTPATIENT
Start: 2025-01-24 | End: 2025-01-30

## 2025-01-24 RX ADMIN — Medication 1000 MILLILITER(S): at 21:06

## 2025-01-24 RX ADMIN — ACETAMINOPHEN 650 MILLIGRAM(S): 160 SUSPENSION ORAL at 21:07

## 2025-01-24 RX ADMIN — ACETAMINOPHEN 650 MILLIGRAM(S): 160 SUSPENSION ORAL at 23:20

## 2025-01-24 NOTE — ED ADULT NURSE REASSESSMENT NOTE - NS ED NURSE REASSESS COMMENT FT1
Plan of care assumed from TESSIE Salas. Pt. resting in stretcher, endorsing no complaints. Vital signs obtained and documented. Stretcher in lowest position. Pt. made aware of plan of care.

## 2025-01-24 NOTE — ED PROVIDER NOTE - PHYSICAL EXAMINATION
Constitutional: NAD   HEENT: NCAT, PERRL, EOMI, No willis sign, no raccoon eyes, no hemotympanum, no csf rhinorrhea, no nasal septal hematoma  Cardiac: RRR, no mrg  Resp: unlabored breathing, b/l breath sounds  GI: Abd s/nt/nd  Neuro: grossly normal and intact GCS 15 no neuro deficits  MSK: +right hip pain on ROM, NVI  Skin: No rashes, no bruising to chest, back, abdomen or extremities

## 2025-01-24 NOTE — ED ADULT NURSE NOTE - NSFALLHARMRISKINTERV_ED_ALL_ED
Assistance OOB with selected safe patient handling equipment if applicable/Communicate risk of Fall with Harm to all staff, patient, and family/Orthostatic vital signs/Provide visual cue: red socks, yellow wristband, yellow gown, etc/Reinforce activity limits and safety measures with patient and family/Bed in lowest position, wheels locked, appropriate side rails in place/Call bell, personal items and telephone in reach/Instruct patient to call for assistance before getting out of bed/chair/stretcher/Non-slip footwear applied when patient is off stretcher/[place] to call system/Physically safe environment - no spills, clutter or unnecessary equipment/Purposeful Proactive Rounding/Room/bathroom lighting operational, light cord in reach

## 2025-01-24 NOTE — ED PROVIDER NOTE - OBJECTIVE STATEMENT
79-year-old female from home with a history of dementia hypertension hyperlipidemia right hip replacement presenting with an unwitnessed fall at home.   Was with  at home who heard the fall in the kitchen patient was found on her back.  She does not recall falling.  Admits to drinking alcohol today.  Complaining of right sided lower back pain.  Denies any headache or cervical neck pain.  No other complaints of pain.  Denies any palpitations, chest pain, shortness of breath, abdominal pain, urinary symptoms diarrhea or abdominal pain.  Denies fevers.  Patient was recently diagnosed with the flu.  Her daughter at bedside when she evaluated her at home she had a low blood pressure systolic of 70s.  Review of systems otherwise negative    Vital signs within normal limits well-appearing on exam able to move all extremities no spinal tenderness or step-offs    Rule out infection electrolyte metabolic abnormality labs urine chest x-ray EKG pelvis x-ray see mdm

## 2025-01-24 NOTE — CONSULT NOTE ADULT - SUBJECTIVE AND OBJECTIVE BOX
Patient is a 79yFemale ambulator without assistive devices who presents to [facility] ED w/ a c/o of R hip pain. Patient had unwitnessed fall at home, per family at bedside they heard a loud noise and found the patient on the floor. The patient was then complaining of some lower buttock and hip pain. Sp right JOSE MIGUEL with subsequent revision following a dislocation event at [facility] with Dr. Josué Weinstein approximately 5 years ago. Pt confused and denies fall. Baseline dementia, able to answer questions and follow commands. Unsure HT no LOC. Denies AC. Patient has been able to ambulate following the fall with mild soreness. Denies any numbness or tingling. Denies having any other pain elsewhere. Denies orthopedic history. No other orthopedic concerns at this time.    T(C): 36.6 (01-24-25 @ 18:49), Max: 36.6 (01-24-25 @ 18:49)  HR: 79 (01-24-25 @ 18:49) (79 - 79)  BP: 125/55 (01-24-25 @ 18:49) (125/55 - 125/55)  RR: 16 (01-24-25 @ 18:49) (16 - 16)  SpO2: 99% (01-24-25 @ 18:49) (99% - 99%)        MEWS Score    No pertinent past medical history    No pertinent past medical history    FALL    90+    SysAdmin_VisitLink        Gen: NAD, Resting comfortably    RLE:  Skin intact, no erythema or ecchymosis  Mild TTP by hip, nowhere else along RLE  Motor: + EHL/FHL/TA/GSC  Sensory: +SILT: SPN/DPN/DIPTI/SAPH/TIB  Negative axial load and log roll  able to SLR  Compartments soft and compressible  No calf tenderness  + DP/PT    Secondary Assessment:  NC/AT, NTTP of clavicles, NTTP of C-,T-,L-Spine,  UEs: NTTP of Shoulders, Elbows, Wrists, Hands; NT with AROM/PROM of Shoulders, Elbows, Wrists, Hands; AIN/PIN/Med/Uln/Msc/Rad/Ax intact  L LE: Able to SLR, NT with Log Roll, NT with Heel Strike, NTTP of Hip, Knee, Ankle, foot; NT with AROM/PROM of Hip, Knee, Ankle, foot; Q/H/Gsc/TA/EHL/FHL intact    Imaging:  XR R Hip: lesser trochanter fracture sp R JOSE MIGUEL, pending official read  CT Bony pelvis: pending    A/P: 79yFemale with lesser trochanteric fracture sp R JOSE MIGUEL.     -FU CT Bony pelvis to assess for further propagation of fracture line  -FU XR R femur  -Keep NWB for now until CT has been performed to ensure no further injury or intervention warranted  -Final plan pending imaging and discussion with Dr. Sosa  -Pain Control As Needed   -Ice hip as tolerated      Discussed with attending who is in agreement with above plan       Patient is a 79yFemale ambulator without assistive devices who presents to [facility] ED w/ a c/o of R hip pain. Patient had unwitnessed fall at home, per family at bedside they heard a loud noise and found the patient on the floor. The patient was then complaining of some lower buttock and hip pain. Sp right JOSE MIGUEL with subsequent revision following a dislocation event at [facility] with Dr. Josué Weinstein approximately 5 years ago. Pt confused and denies fall. Baseline dementia, able to answer questions and follow commands. Unsure HT no LOC. Denies AC. Patient has been able to ambulate following the fall with mild soreness. Denies any numbness or tingling. Denies having any other pain elsewhere. Denies orthopedic history. No other orthopedic concerns at this time.    T(C): 36.6 (01-24-25 @ 18:49), Max: 36.6 (01-24-25 @ 18:49)  HR: 79 (01-24-25 @ 18:49) (79 - 79)  BP: 125/55 (01-24-25 @ 18:49) (125/55 - 125/55)  RR: 16 (01-24-25 @ 18:49) (16 - 16)  SpO2: 99% (01-24-25 @ 18:49) (99% - 99%)        MEWS Score    No pertinent past medical history    No pertinent past medical history    FALL    90+    SysAdmin_VisitLink        Gen: NAD, Resting comfortably    RLE:  Skin intact, no erythema or ecchymosis  Mild TTP by hip, nowhere else along RLE  Motor: + EHL/FHL/TA/GSC  Sensory: +SILT: SPN/DPN/DIPTI/SAPH/TIB  Negative axial load and log roll  able to SLR  Compartments soft and compressible  No calf tenderness  + DP/PT    Secondary Assessment:  NC/AT, NTTP of clavicles, NTTP of C-,T-,L-Spine,  UEs: NTTP of Shoulders, Elbows, Wrists, Hands; NT with AROM/PROM of Shoulders, Elbows, Wrists, Hands; AIN/PIN/Med/Uln/Msc/Rad/Ax intact  L LE: Able to SLR, NT with Log Roll, NT with Heel Strike, NTTP of Hip, Knee, Ankle, foot; NT with AROM/PROM of Hip, Knee, Ankle, foot; Q/H/Gsc/TA/EHL/FHL intact    Imaging:  XR R Hip: lesser trochanter fracture sp R JOSE MIGUEL, pending official read  CT Bony pelvis: pending    A/P: 79yFemale with lesser trochanteric fracture sp R JOSE MIGUEL.     -FU CT Bony pelvis to assess for further propagation of fracture line  -Keep NWB for now until CT official read is back to ensure no further injury or intervention warranted  -Final plan pending imaging and discussion with Dr. Sosa  -Pain Control As Needed   -Ice hip as tolerated  Discussed with attending who is in agreement with above plan

## 2025-01-24 NOTE — ED PROVIDER NOTE - CLINICAL SUMMARY MEDICAL DECISION MAKING FREE TEXT BOX
79-year-old female from home with a history of cognitive impairment, hypertension, hyperlipidemia, right hip replacement at [place] (2019) presenting with an unwitnessed fall at home.   Was with  at home who heard the fall in the kitchen patient was found on her back.  She does not recall falling.  Admits to drinking alcohol today.  Complaining of right sided lower back/hip pain.  Denies any headache or cervical neck pain.  No other complaints of pain.  Denies any palpitations, chest pain, shortness of breath, abdominal pain, urinary symptoms diarrhea or abdominal pain.  Denies fevers.  Patient was recently diagnosed with the flu.  Her daughter at bedside when she evaluated her at home she had a low blood pressure systolic of 70s.  Review of systems otherwise negative    Vital signs within normal limits well-appearing on exam able to move all extremities no spinal tenderness or step-offs    Rule out infection electrolyte metabolic abnormality labs urine chest x-ray EKG pelvis x-ray

## 2025-01-24 NOTE — ED ADULT NURSE NOTE - OBJECTIVE STATEMENT
80 y/o alert female presents to ED for cc of fall from home,  at bedside reports that he heard a loud sound and found his wife laying in the kitchen floor on her back, pt does not recall incident, unknown head strike, +LOC, pt states that she had the flu two weeks ago, +alcohol, pt reports drinking a shot of vodka today pt complaining of mile lower back pain. NAD at this time, pt placed in front of the Nurse station, bed in lowest position, EKG performed at bedside, cardiac monitor in place, cardiac rhythm NSR, HR 80bpm, Pt denies neck pain chest pain and SOB..

## 2025-01-24 NOTE — H&P ADULT - HISTORY OF PRESENT ILLNESS
HPI:  79-year-old female  history of cognitive impairment, hypertension, hyperlipidemia, right hip replacement at [place] (2019) presenting with an unwitnessed fall at home. Pt is a poor historian has no recollection of how she fell or the events afterward.    Was with  at home who heard the fall in the kitchen patient was found on her back.  She does not recall falling.  Admits to drinking alcohol today.  Complaining of right sided lower back/hip pain. No other complaints of pain.  Denies any palpitations, chest pain, shortness of breath, abdominal pain, urinary symptoms diarrhea or abdominal pain.  Denies fevers.  Patient was recently diagnosed with the flu 2 weeks ago her daugher states that at home she had low BP readings of 70s. However BP has been normal here. Imaging did show R sided trochanter fracture, ortho consulted.     PAST MEDICAL & SURGICAL HISTORY:  No pertinent past medical history        FAMILY HISTORY:    Social History:      Allergies    penicillins (Unknown)  Ceclor (Unknown)    Intolerances        MEDICATIONS  (STANDING):  heparin   Injectable 5000 Unit(s) SubCutaneous every 12 hours    MEDICATIONS  (PRN):  acetaminophen     Tablet .. 650 milliGRAM(s) Oral every 6 hours PRN Mild Pain (1 - 3)  albuterol/ipratropium for Nebulization 3 milliLiter(s) Nebulizer every 6 hours PRN Shortness of Breath and/or Wheezing  aluminum hydroxide/magnesium hydroxide/simethicone Suspension 30 milliLiter(s) Oral every 4 hours PRN Dyspepsia  melatonin 3 milliGRAM(s) Oral at bedtime PRN Insomnia  ondansetron Injectable 4 milliGRAM(s) IV Push every 8 hours PRN Nausea and/or Vomiting      ROS:  10 point ROS negative except for ones mentioned in HPI    PE   HEENT:  Head is normocephalic    Skin:  Warm and dry without any rash   NECK:  Supple without lymphadenopathy.   HEART:  Regular rate and rhythm. normal S1 and S2, No M/R/G  LUNGS:  diffuse ronchi  b/;  Abdomen examnondistended with good bowel sounds heard  EXTREMITIES:  Without cyanosis, clubbing or edema.   NEUROLOGICAL:  Gross nonfocal      PEx  T(C): 36.8 (01-24-25 @ 23:24), Max: 36.8 (01-24-25 @ 23:24)  HR: 73 (01-24-25 @ 23:24) (73 - 79)  BP: 108/55 (01-24-25 @ 23:24) (108/55 - 125/55)  RR: 17 (01-24-25 @ 23:24) (16 - 17)  SpO2: 92% (01-24-25 @ 23:24) (92% - 99%)  Wt(kg): --                        12.6   12.55 )-----------( 264      ( 24 Jan 2025 19:55 )             35.7     01-24    133[L]  |  102  |  51[H]  ----------------------------<  128[H]  4.5   |  25  |  1.87[H]    Ca    9.5      24 Jan 2025 19:55    TPro  7.7  /  Alb  3.6  /  TBili  0.7  /  DBili  x   /  AST  29  /  ALT  18  /  AlkPhos  84  01-24    CAPILLARY BLOOD GLUCOSE          Urinalysis Basic - ( 24 Jan 2025 19:55 )    Color: x / Appearance: x / SG: x / pH: x  Gluc: 128 mg/dL / Ketone: x  / Bili: x / Urobili: x   Blood: x / Protein: x / Nitrite: x   Leuk Esterase: x / RBC: x / WBC x   Sq Epi: x / Non Sq Epi: x / Bacteria: x                Radiology/Imaging, I have personally reviewed:        IMPRESSION:  No acute osseous abnormality.

## 2025-01-24 NOTE — ED ADULT NURSE NOTE - NSFALLLASTDATE_ED_ALL_ED_DT
24-Jan-2025 21:25 Olumiant Pregnancy And Lactation Text: Based on animal studies, Olumiant may cause embryo-fetal harm when administered to pregnant women.  The medication should not be used in pregnancy.  Breastfeeding is not recommended during treatment.

## 2025-01-24 NOTE — ED ADULT TRIAGE NOTE - CHIEF COMPLAINT QUOTE
Pt BIBEMS from home s/p fall. Per EMS, pt family found pt on floor in bathroom. Pt denies headstrike, -LOC, -blood thinner use. Pt admits to drinking vodka. NA [id number], pt taken to CT scan.

## 2025-01-24 NOTE — H&P ADULT - ASSESSMENT
79-year-old female  history of cognitive impairment, hypertension, hyperlipidemia, right hip replacement at [place] (2019) presenting with an unwitnessed fall at home. Admitted for concern for syncope and right hip pain      Assessment/Plan:    # Syncope, rule out cardiogenic     - Tele  - TTE  - Orthostatic vitals  - CT head negative   - Cardiology consult     # Leukocytosis    - check UA and CT chest, pt having cough for 2 weeks       # MICHELLE    - Urine studies  - 1 L NS    #hyponatremia    # R Hip pain  - CT negative for fracture  - orthopedics following     #cognitive impairment  - cw donezepil    Hypertension,   hyperlipidemia    Code status: Full  Diet: Heart healthy  DVT ppx heparin   Activity: Bedrest  Disposition: Admission for syncope and fall    79-year-old female  history of cognitive impairment, hypertension, hyperlipidemia, right hip replacement at [place] (2019) presenting with an unwitnessed fall at home. Admitted for concern for syncope and right hip pain      Assessment/Plan:    # Syncope, rule out cardiogenic     - Tele  - TTE  - Orthostatic vitals  - CT head negative   - Cardiology consult     # Leukocytosis    - check UA and CT chest, pt having cough for 2 weeks       # MICHELLE    - Urine studies  - 1 L NS    #hyponatremia    # R Hip pain  - CT negative for fracture  - orthopedics following     #cognitive impairment  - cw donezepil    #Hypertension,   - hold for soft bp    #hyperlipidemia  - cw crestor    #depression  - wellbutrin      Code status: Full  Diet: Heart healthy  DVT ppx heparin   Activity: Bedrest  Disposition: Admission for syncope and fall

## 2025-01-24 NOTE — ED ADULT NURSE NOTE - CHIEF COMPLAINT QUOTE
Pt BIBEMS from home s/p fall. Per EMS, pt family found pt on floor in bathroom. Pt denies headstrike, -LOC, -blood thinner use. Pt admits to drinking vodka. NA [id number], pt taken to CT scan.

## 2025-01-25 ENCOUNTER — RESULT REVIEW (OUTPATIENT)
Age: 80
End: 2025-01-25

## 2025-01-25 DIAGNOSIS — R55 SYNCOPE AND COLLAPSE: ICD-10-CM

## 2025-01-25 LAB
ANION GAP SERPL CALC-SCNC: 3 MMOL/L — LOW (ref 5–17)
BUN SERPL-MCNC: 46 MG/DL — HIGH (ref 7–23)
CALCIUM SERPL-MCNC: 9.1 MG/DL — SIGNIFICANT CHANGE UP (ref 8.5–10.1)
CHLORIDE SERPL-SCNC: 105 MMOL/L — SIGNIFICANT CHANGE UP (ref 96–108)
CO2 SERPL-SCNC: 28 MMOL/L — SIGNIFICANT CHANGE UP (ref 22–31)
CREAT SERPL-MCNC: 1.61 MG/DL — HIGH (ref 0.5–1.3)
EGFR: 32 ML/MIN/1.73M2 — LOW
GLUCOSE SERPL-MCNC: 103 MG/DL — HIGH (ref 70–99)
HCT VFR BLD CALC: 35.1 % — SIGNIFICANT CHANGE UP (ref 34.5–45)
HGB BLD-MCNC: 11.9 G/DL — SIGNIFICANT CHANGE UP (ref 11.5–15.5)
MCHC RBC-ENTMCNC: 31 PG — SIGNIFICANT CHANGE UP (ref 27–34)
MCHC RBC-ENTMCNC: 33.9 G/DL — SIGNIFICANT CHANGE UP (ref 32–36)
MCV RBC AUTO: 91.4 FL — SIGNIFICANT CHANGE UP (ref 80–100)
PLATELET # BLD AUTO: 267 K/UL — SIGNIFICANT CHANGE UP (ref 150–400)
POTASSIUM SERPL-MCNC: 4 MMOL/L — SIGNIFICANT CHANGE UP (ref 3.5–5.3)
POTASSIUM SERPL-SCNC: 4 MMOL/L — SIGNIFICANT CHANGE UP (ref 3.5–5.3)
RBC # BLD: 3.84 M/UL — SIGNIFICANT CHANGE UP (ref 3.8–5.2)
RBC # FLD: 12.1 % — SIGNIFICANT CHANGE UP (ref 10.3–14.5)
SODIUM SERPL-SCNC: 136 MMOL/L — SIGNIFICANT CHANGE UP (ref 135–145)
WBC # BLD: 10.7 K/UL — HIGH (ref 3.8–10.5)
WBC # FLD AUTO: 10.7 K/UL — HIGH (ref 3.8–10.5)

## 2025-01-25 PROCEDURE — 99232 SBSQ HOSP IP/OBS MODERATE 35: CPT

## 2025-01-25 PROCEDURE — 93306 TTE W/DOPPLER COMPLETE: CPT | Mod: 26

## 2025-01-25 PROCEDURE — 99222 1ST HOSP IP/OBS MODERATE 55: CPT

## 2025-01-25 RX ORDER — ROSUVASTATIN CALCIUM 10 MG/1
20 TABLET, FILM COATED ORAL AT BEDTIME
Refills: 0 | Status: DISCONTINUED | OUTPATIENT
Start: 2025-01-25 | End: 2025-01-30

## 2025-01-25 RX ORDER — DONEPEZIL HYDROCHLORIDE 10 MG/1
5 TABLET, FILM COATED ORAL AT BEDTIME
Refills: 0 | Status: DISCONTINUED | OUTPATIENT
Start: 2025-01-25 | End: 2025-01-30

## 2025-01-25 RX ORDER — QUETIAPINE FUMARATE 300 MG/1
50 TABLET ORAL DAILY
Refills: 0 | Status: DISCONTINUED | OUTPATIENT
Start: 2025-01-25 | End: 2025-01-30

## 2025-01-25 RX ORDER — BUPROPION HYDROCHLORIDE 150 MG/1
150 TABLET, EXTENDED RELEASE ORAL DAILY
Refills: 0 | Status: DISCONTINUED | OUTPATIENT
Start: 2025-01-25 | End: 2025-01-30

## 2025-01-25 RX ADMIN — ROSUVASTATIN CALCIUM 20 MILLIGRAM(S): 10 TABLET, FILM COATED ORAL at 21:07

## 2025-01-25 RX ADMIN — BUPROPION HYDROCHLORIDE 150 MILLIGRAM(S): 150 TABLET, EXTENDED RELEASE ORAL at 09:57

## 2025-01-25 RX ADMIN — DONEPEZIL HYDROCHLORIDE 5 MILLIGRAM(S): 10 TABLET, FILM COATED ORAL at 21:07

## 2025-01-25 RX ADMIN — Medication 5000 UNIT(S): at 21:07

## 2025-01-25 RX ADMIN — QUETIAPINE FUMARATE 50 MILLIGRAM(S): 300 TABLET ORAL at 09:57

## 2025-01-25 NOTE — CONSULT NOTE ADULT - SUBJECTIVE AND OBJECTIVE BOX
CHIEF COMPLAINT:  fall     HPI:  Ms. Sosa is an 78 yo female with a hx cognitive impairment, hypertension, dyslipidemia, presenting after an unwitnessed fall. Pt unable to provide details of event. Per chart review, her husbnad her her fall and found patient on her back. Unclear if there was loss of consciousness. She has no cardiac hx. Of note, recent flu infection 2 weeks ago.     In the ED, found to have R trochanter fx for which orthopedic surgery was consulted.   Cardiology consulted for syncope.       PAST MEDICAL & SURGICAL HISTORY:  No pertinent past medical history        FAMILY HISTORY:    Social History:      Allergies    penicillins (Unknown)  Ceclor (Unknown)    Intolerances        MEDICATIONS  (STANDING):  heparin   Injectable 5000 Unit(s) SubCutaneous every 12 hours    MEDICATIONS  (PRN):  acetaminophen     Tablet .. 650 milliGRAM(s) Oral every 6 hours PRN Mild Pain (1 - 3)  albuterol/ipratropium for Nebulization 3 milliLiter(s) Nebulizer every 6 hours PRN Shortness of Breath and/or Wheezing  aluminum hydroxide/magnesium hydroxide/simethicone Suspension 30 milliLiter(s) Oral every 4 hours PRN Dyspepsia  melatonin 3 milliGRAM(s) Oral at bedtime PRN Insomnia  ondansetron Injectable 4 milliGRAM(s) IV Push every 8 hours PRN Nausea and/or Vomiting      ROS:  10 point ROS negative except for ones mentioned in HPI    PE   HEENT:  Head is normocephalic    Skin:  Warm and dry without any rash   NECK:  Supple without lymphadenopathy.   HEART:  Regular rate and rhythm. normal S1 and S2, No M/R/G  LUNGS:  diffuse ronchi  b/;  Abdomen examnondistended with good bowel sounds heard  EXTREMITIES:  Without cyanosis, clubbing or edema.   NEUROLOGICAL:  Gross nonfocal      PEx  T(C): 36.8 (01-24-25 @ 23:24), Max: 36.8 (01-24-25 @ 23:24)  HR: 73 (01-24-25 @ 23:24) (73 - 79)  BP: 108/55 (01-24-25 @ 23:24) (108/55 - 125/55)  RR: 17 (01-24-25 @ 23:24) (16 - 17)  SpO2: 92% (01-24-25 @ 23:24) (92% - 99%)  Wt(kg): --                        12.6   12.55 )-----------( 264      ( 24 Jan 2025 19:55 )             35.7     01-24    133[L]  |  102  |  51[H]  ----------------------------<  128[H]  4.5   |  25  |  1.87[H]    Ca    9.5      24 Jan 2025 19:55    TPro  7.7  /  Alb  3.6  /  TBili  0.7  /  DBili  x   /  AST  29  /  ALT  18  /  AlkPhos  84  01-24    CAPILLARY BLOOD GLUCOSE          Urinalysis Basic - ( 24 Jan 2025 19:55 )    Color: x / Appearance: x / SG: x / pH: x  Gluc: 128 mg/dL / Ketone: x  / Bili: x / Urobili: x   Blood: x / Protein: x / Nitrite: x   Leuk Esterase: x / RBC: x / WBC x   Sq Epi: x / Non Sq Epi: x / Bacteria: x                Radiology/Imaging, I have personally reviewed:        IMPRESSION:  No acute osseous abnormality.   (24 Jan 2025 23:48)      PAST MEDICAL / SURGICAL HISTORY:  PAST MEDICAL & SURGICAL HISTORY:  No pertinent past medical history          SOCIAL HISTORY:   Alcohol: Denied  Smoking: Nonsmoker  Drug Use: Denied  Marital Status:     FAMILY HISTORY: FAMILY HISTORY:      MEDICATIONS  (STANDING):  buPROPion XL (24-Hour) . 150 milliGRAM(s) Oral daily  donepezil 5 milliGRAM(s) Oral at bedtime  heparin   Injectable 5000 Unit(s) SubCutaneous every 12 hours  QUEtiapine 50 milliGRAM(s) Oral daily  rosuvastatin 20 milliGRAM(s) Oral at bedtime    MEDICATIONS  (PRN):  acetaminophen     Tablet .. 650 milliGRAM(s) Oral every 6 hours PRN Mild Pain (1 - 3)  albuterol/ipratropium for Nebulization 3 milliLiter(s) Nebulizer every 6 hours PRN Shortness of Breath and/or Wheezing  aluminum hydroxide/magnesium hydroxide/simethicone Suspension 30 milliLiter(s) Oral every 4 hours PRN Dyspepsia  melatonin 3 milliGRAM(s) Oral at bedtime PRN Insomnia  ondansetron Injectable 4 milliGRAM(s) IV Push every 8 hours PRN Nausea and/or Vomiting      Allergies    penicillins (Unknown)  Ceclor (Unknown)    Intolerances        REVIEW OF SYSTEMS:  CONSTITUTIONAL: No weakness, fevers or chills  Eyes: No visual changes  NECK: No pain or stiffness  RESPIRATORY: No cough, wheezing, hemoptysis; No shortness of breath  CARDIOVASCULAR: No chest pain or palpitations  GASTROINTESTINAL: No abdominal pain. No nausea, vomiting, or hematemesis; No diarrhea or constipation. No melena or hematochezia.  GENITOURINARY: No dysuria, frequency or hematuria  NEUROLOGICAL: No numbness.  SKIN: No itching or rash  All other review of systems is negative unless indicated above    VITAL SIGNS:   Vital Signs Last 24 Hrs  T(C): 36.8 (25 Jan 2025 09:29), Max: 36.8 (24 Jan 2025 23:24)  T(F): 98.3 (25 Jan 2025 09:29), Max: 98.3 (25 Jan 2025 09:29)  HR: 71 (25 Jan 2025 09:29) (70 - 79)  BP: 116/61 (25 Jan 2025 09:29) (102/51 - 153/63)  BP(mean): 79 (25 Jan 2025 01:19) (77 - 79)  RR: 18 (25 Jan 2025 09:29) (16 - 19)  SpO2: 97% (25 Jan 2025 09:29) (92% - 99%)    Parameters below as of 25 Jan 2025 09:29  Patient On (Oxygen Delivery Method): room air        I&O's Summary    25 Jan 2025 07:01  -  25 Jan 2025 11:59  --------------------------------------------------------  IN: 480 mL / OUT: 0 mL / NET: 480 mL        PHYSICAL EXAM:  Constitutional: NAD, awake and alert  HEENT:  EOMI,  Pupils round, No oral cyanosis.  Pulmonary: CTA b/l without wheezing or rhonchi   Cardiovascular: S1 and S2, regular rate and rhythm, no Murmurs, gallops or rubs  Gastrointestinal: Bowel Sounds present, soft, nontender.   Lymph: No peripheral edema. No cervical lymphadenopathy.  Neurological: Alert, no focal deficits  Skin: No rashes.  Psych:  Mood & affect appropriate    LABS:                        11.9   10.70 )-----------( 267      ( 25 Jan 2025 07:32 )             35.1                         12.6   12.55 )-----------( 264      ( 24 Jan 2025 19:55 )             35.7     25 Jan 2025 07:32    136    |  105    |  46     ----------------------------<  103    4.0     |  28     |  1.61   24 Jan 2025 19:55    133    |  102    |  51     ----------------------------<  128    4.5     |  25     |  1.87     Ca    9.1        25 Jan 2025 07:32  Ca    9.5        24 Jan 2025 19:55    TPro  7.7    /  Alb  3.6    /  TBili  0.7    /  DBili  x      /  AST  29     /  ALT  18     /  AlkPhos  84     24 Jan 2025 19:55

## 2025-01-25 NOTE — PROGRESS NOTE ADULT - ASSESSMENT
79-year-old female  history of cognitive impairment, hypertension, hyperlipidemia, right hip replacement at [place] (2019) presenting with an unwitnessed fall at home. Admitted for concern for syncope and right hip pain      Assessment/Plan:    # Syncope, rule out cardiogenic   - Tele  - TTE - pending   - Orthostatic vitals, when able   - CT head negative   - Cardiology consult recommendations appreciated     # Leukocytosis/cough   pt having cough for 2 weeks   CT chest Patchy groundglass opacities in the lingula and right lower   lobe are noted. Etiology is unclear.  UA pending     # MICHELLE - unknown baseline   - s/p 1 L NS  Cr improved to 1.61    #hyponatremia  improved after iv fluids     # R Hip pain  - Comminuted fracture involving the sacrum and coccyx is which   is favored to be acute.  - orthopedics following   -NWB at this time     #cognitive impairment  - cw donezepil    #Hypertension,   - hold for soft bp    #hyperlipidemia  - cw crestor    #depression  - wellbutrin      Code status: Full  Diet: Heart healthy  DVT ppx heparin   Activity: Bedrest  Disposition: Admission for syncope and fall

## 2025-01-25 NOTE — PATIENT PROFILE ADULT - FALL HARM RISK - HARM RISK INTERVENTIONS

## 2025-01-25 NOTE — PATIENT PROFILE ADULT - NSPRESCRALCFREQ_GEN_A_NUR
LOV: 03/13/2024    Pt to FU in 6 months  Apt scheduled for 09/11/2024    Labs completed on 03/06/2024    
Monthly or less

## 2025-01-26 LAB
ANION GAP SERPL CALC-SCNC: 4 MMOL/L — LOW (ref 5–17)
APPEARANCE UR: CLEAR — SIGNIFICANT CHANGE UP
BACTERIA # UR AUTO: NEGATIVE /HPF — SIGNIFICANT CHANGE UP
BILIRUB UR-MCNC: NEGATIVE — SIGNIFICANT CHANGE UP
BUN SERPL-MCNC: 37 MG/DL — HIGH (ref 7–23)
CALCIUM SERPL-MCNC: 8.8 MG/DL — SIGNIFICANT CHANGE UP (ref 8.5–10.1)
CAST: 2 /LPF — SIGNIFICANT CHANGE UP (ref 0–4)
CHLORIDE SERPL-SCNC: 105 MMOL/L — SIGNIFICANT CHANGE UP (ref 96–108)
CO2 SERPL-SCNC: 26 MMOL/L — SIGNIFICANT CHANGE UP (ref 22–31)
COLOR SPEC: YELLOW — SIGNIFICANT CHANGE UP
CREAT ?TM UR-MCNC: 129 MG/DL — SIGNIFICANT CHANGE UP
CREAT SERPL-MCNC: 1.4 MG/DL — HIGH (ref 0.5–1.3)
DIFF PNL FLD: ABNORMAL
EGFR: 38 ML/MIN/1.73M2 — LOW
GLUCOSE SERPL-MCNC: 100 MG/DL — HIGH (ref 70–99)
GLUCOSE UR QL: NEGATIVE MG/DL — SIGNIFICANT CHANGE UP
HCT VFR BLD CALC: 33.6 % — LOW (ref 34.5–45)
HGB BLD-MCNC: 11.3 G/DL — LOW (ref 11.5–15.5)
KETONES UR-MCNC: NEGATIVE MG/DL — SIGNIFICANT CHANGE UP
LEUKOCYTE ESTERASE UR-ACNC: ABNORMAL
MCHC RBC-ENTMCNC: 31.1 PG — SIGNIFICANT CHANGE UP (ref 27–34)
MCHC RBC-ENTMCNC: 33.6 G/DL — SIGNIFICANT CHANGE UP (ref 32–36)
MCV RBC AUTO: 92.6 FL — SIGNIFICANT CHANGE UP (ref 80–100)
NITRITE UR-MCNC: NEGATIVE — SIGNIFICANT CHANGE UP
OSMOLALITY UR: 769 MOSM/KG — SIGNIFICANT CHANGE UP (ref 50–1200)
PH UR: 5.5 — SIGNIFICANT CHANGE UP (ref 5–8)
PLATELET # BLD AUTO: 255 K/UL — SIGNIFICANT CHANGE UP (ref 150–400)
POTASSIUM SERPL-MCNC: 3.4 MMOL/L — LOW (ref 3.5–5.3)
POTASSIUM SERPL-SCNC: 3.4 MMOL/L — LOW (ref 3.5–5.3)
POTASSIUM UR-SCNC: 24.5 MMOL/L — SIGNIFICANT CHANGE UP
PROT ?TM UR-MCNC: 59 MG/DL — HIGH (ref 0–12)
PROT UR-MCNC: 30 MG/DL
PROT/CREAT UR-RTO: 0.5 RATIO — HIGH (ref 0–0.2)
RBC # BLD: 3.63 M/UL — LOW (ref 3.8–5.2)
RBC # FLD: 12 % — SIGNIFICANT CHANGE UP (ref 10.3–14.5)
RBC CASTS # UR COMP ASSIST: 0 /HPF — SIGNIFICANT CHANGE UP (ref 0–4)
SODIUM SERPL-SCNC: 135 MMOL/L — SIGNIFICANT CHANGE UP (ref 135–145)
SODIUM UR-SCNC: 68 MMOL/L — SIGNIFICANT CHANGE UP
SP GR SPEC: 1.02 — SIGNIFICANT CHANGE UP (ref 1–1.03)
SQUAMOUS # UR AUTO: 2 /HPF — SIGNIFICANT CHANGE UP (ref 0–5)
UROBILINOGEN FLD QL: 1 MG/DL — SIGNIFICANT CHANGE UP (ref 0.2–1)
UUN UR-MCNC: 1483 MG/DL — SIGNIFICANT CHANGE UP
WBC # BLD: 8.27 K/UL — SIGNIFICANT CHANGE UP (ref 3.8–10.5)
WBC # FLD AUTO: 8.27 K/UL — SIGNIFICANT CHANGE UP (ref 3.8–10.5)
WBC UR QL: 14 /HPF — HIGH (ref 0–5)

## 2025-01-26 PROCEDURE — 99232 SBSQ HOSP IP/OBS MODERATE 35: CPT

## 2025-01-26 RX ORDER — AMLODIPINE BESYLATE 5 MG
5 TABLET ORAL DAILY
Refills: 0 | Status: DISCONTINUED | OUTPATIENT
Start: 2025-01-27 | End: 2025-01-30

## 2025-01-26 RX ORDER — CIPROFLOXACIN HCL 500 MG
250 TABLET ORAL EVERY 12 HOURS
Refills: 0 | Status: DISCONTINUED | OUTPATIENT
Start: 2025-01-26 | End: 2025-01-29

## 2025-01-26 RX ORDER — POTASSIUM CHLORIDE 750 MG/1
20 TABLET, EXTENDED RELEASE ORAL
Refills: 0 | Status: COMPLETED | OUTPATIENT
Start: 2025-01-26 | End: 2025-01-26

## 2025-01-26 RX ADMIN — Medication 5000 UNIT(S): at 10:31

## 2025-01-26 RX ADMIN — DONEPEZIL HYDROCHLORIDE 5 MILLIGRAM(S): 10 TABLET, FILM COATED ORAL at 21:05

## 2025-01-26 RX ADMIN — POTASSIUM CHLORIDE 20 MILLIEQUIVALENT(S): 750 TABLET, EXTENDED RELEASE ORAL at 18:24

## 2025-01-26 RX ADMIN — Medication 250 MILLIGRAM(S): at 21:05

## 2025-01-26 RX ADMIN — Medication 5000 UNIT(S): at 21:06

## 2025-01-26 RX ADMIN — BUPROPION HYDROCHLORIDE 150 MILLIGRAM(S): 150 TABLET, EXTENDED RELEASE ORAL at 10:31

## 2025-01-26 RX ADMIN — POTASSIUM CHLORIDE 20 MILLIEQUIVALENT(S): 750 TABLET, EXTENDED RELEASE ORAL at 21:05

## 2025-01-26 RX ADMIN — QUETIAPINE FUMARATE 50 MILLIGRAM(S): 300 TABLET ORAL at 10:31

## 2025-01-26 RX ADMIN — ROSUVASTATIN CALCIUM 20 MILLIGRAM(S): 10 TABLET, FILM COATED ORAL at 21:06

## 2025-01-26 NOTE — PROGRESS NOTE ADULT - PROBLEM SELECTOR PLAN 1
·  Problem: Syncope.   ·  Recommendation: pt presenting after a fall with CT showing presumed acute comminuted fracture involving the sacrum and coccyx. concern for possible LOC- pt unable to recall details of event or if prodromal sx were present.    .  CT head negative  .  Hs trop neg   .  ECG NSR without evidence of ischemic changes or conduction disease  .  No events noted on tele.  Consider OP extended monitor  .  echo showed hyperdynamic LVF, EF 70-75%, Mild-Mod LVH, Mild MR  .  check orthostatics ·  Problem: Syncope.   ·  Recommendation: pt presenting after a fall with CT showing presumed acute comminuted fracture involving the sacrum and coccyx. concern for possible LOC- pt unable to recall details of event or if prodromal sx were present.    .  CT head negative  .  Hs trop neg   .  ECG NSR without evidence of ischemic changes or conduction disease  .  No events noted on tele.  Consider OP extended monitor  .  echo showed hyperdynamic LVF, EF 70-75%, Mild-Mod LVH, Mild MR  .   orthostatics negative

## 2025-01-26 NOTE — PROGRESS NOTE ADULT - NS ATTEND AMEND GEN_ALL_CORE FT
Discussed above with NP     Unclear if LOC. no evidence of arrhythmia on tele   Echo with hyperdynamic function, orthostatic negative   recommend outpatient follow up with cardio at which time would consider cardiac monitor Discussed above with NP     Unclear if LOC. no evidence of arrhythmia on tele   Echo with hyperdynamic function, orthostatic negative   recommend outpatient follow up with cardio at which time would consider cardiac monitor  will sign off. please reconsult as needed

## 2025-01-26 NOTE — PROGRESS NOTE ADULT - ASSESSMENT
79-year-old female  history of cognitive impairment, hypertension, hyperlipidemia, right hip replacement at [place] (2019) presenting with an unwitnessed fall at home. Admitted for concern for syncope and right hip pain    # Syncope, rule out cardiogenic   - Tele  - TTE    2. Left ventricular cavity is normal in size. Left ventricular wall thickness is mildly increased. Left ventricular systolic function is hyperdynamic with an ejection fraction visually estimated at 70 to 75 %.   3. There is evidence of an intracavity gradient noted within the left ventricle in the setting of a hyperdynamic LV function.   4. There is mild (grade 1) leftventricular diastolic dysfunction.  - Orthostatic negative   - CT head negative   - Cardiology consult recommendations appreciated    Consider OP extended monitor    # Leukocytosis/cough   pt having cough for 2 weeks   CT chest Patchy groundglass opacities in the lingula and right lower   lobe are noted. Etiology is unclear.  UA suspicious for UTI   Follow up urine culture  Start PO Cipro- patient with penicillin and ceclor allergy.     # MICHELLE - unknown baseline   - s/p 1 L NS  Cr improved to 1.40    #hyponatremia  improved after iv fluids     # R Hip pain  - Comminuted fracture involving the sacrum and coccyx is which   is favored to be acute.  - orthopedics following   -NWB at this time     #cognitive impairment  - cw Donepezil    #Hypertension,   - hold for soft bp  -now normotensive  -Restart Amlodipine  Continue to hold hctz for now     #hyperlipidemia  - cw crestor    #depression  - wellbutrin      Code status: Full  DVT ppx heparin   Activity: NWB RLE        79-year-old female  history of cognitive impairment, hypertension, hyperlipidemia, right hip replacement at [place] (2019) presenting with an unwitnessed fall at home. Admitted for concern for syncope and right hip pain    # Syncope, rule out cardiogenic   - Tele  - TTE    2. Left ventricular cavity is normal in size. Left ventricular wall thickness is mildly increased. Left ventricular systolic function is hyperdynamic with an ejection fraction visually estimated at 70 to 75 %.   3. There is evidence of an intracavity gradient noted within the left ventricle in the setting of a hyperdynamic LV function.   4. There is mild (grade 1) leftventricular diastolic dysfunction.  - Orthostatic negative   - CT head negative   - Cardiology consult recommendations appreciated    Consider OP extended monitor    # Leukocytosis/cough   pt having cough for 2 weeks   CT chest Patchy groundglass opacities in the lingula and right lower   lobe are noted. Etiology is unclear.  UA suspicious for UTI   Follow up urine culture  Start PO Cipro- patient with penicillin and ceclor allergy.     # MICHELLE - unknown baseline   - s/p 1 L NS  Cr improved to 1.40    #hyponatremia  improved after iv fluids     # R Hip pain/ Hip fracture  - Comminuted fracture involving the sacrum and coccyx is which   is favored to be acute.  - orthopedics following   Discussed with ortho- no further surgical intervention at this time.       #cognitive impairment  - cw Donepezil    #Hypertension,   - hold for soft bp  -now normotensive  -Restart Amlodipine  Continue to hold hctz for now     #hyperlipidemia  - cw crestor    #depression  - wellbutrin      Code status: Full  DVT ppx heparin       Dispo- Possible DC in AM

## 2025-01-27 LAB
ANION GAP SERPL CALC-SCNC: 4 MMOL/L — LOW (ref 5–17)
BUN SERPL-MCNC: 34 MG/DL — HIGH (ref 7–23)
CALCIUM SERPL-MCNC: 9.1 MG/DL — SIGNIFICANT CHANGE UP (ref 8.5–10.1)
CHLORIDE SERPL-SCNC: 110 MMOL/L — HIGH (ref 96–108)
CO2 SERPL-SCNC: 23 MMOL/L — SIGNIFICANT CHANGE UP (ref 22–31)
CREAT SERPL-MCNC: 1.28 MG/DL — SIGNIFICANT CHANGE UP (ref 0.5–1.3)
CULTURE RESULTS: SIGNIFICANT CHANGE UP
EGFR: 42 ML/MIN/1.73M2 — LOW
GLUCOSE SERPL-MCNC: 102 MG/DL — HIGH (ref 70–99)
HCT VFR BLD CALC: 34.4 % — LOW (ref 34.5–45)
HGB BLD-MCNC: 11.5 G/DL — SIGNIFICANT CHANGE UP (ref 11.5–15.5)
MCHC RBC-ENTMCNC: 30.9 PG — SIGNIFICANT CHANGE UP (ref 27–34)
MCHC RBC-ENTMCNC: 33.4 G/DL — SIGNIFICANT CHANGE UP (ref 32–36)
MCV RBC AUTO: 92.5 FL — SIGNIFICANT CHANGE UP (ref 80–100)
PLATELET # BLD AUTO: 257 K/UL — SIGNIFICANT CHANGE UP (ref 150–400)
POTASSIUM SERPL-MCNC: 4 MMOL/L — SIGNIFICANT CHANGE UP (ref 3.5–5.3)
POTASSIUM SERPL-SCNC: 4 MMOL/L — SIGNIFICANT CHANGE UP (ref 3.5–5.3)
RBC # BLD: 3.72 M/UL — LOW (ref 3.8–5.2)
RBC # FLD: 12.3 % — SIGNIFICANT CHANGE UP (ref 10.3–14.5)
SODIUM SERPL-SCNC: 137 MMOL/L — SIGNIFICANT CHANGE UP (ref 135–145)
SPECIMEN SOURCE: SIGNIFICANT CHANGE UP
WBC # BLD: 8.47 K/UL — SIGNIFICANT CHANGE UP (ref 3.8–10.5)
WBC # FLD AUTO: 8.47 K/UL — SIGNIFICANT CHANGE UP (ref 3.8–10.5)

## 2025-01-27 PROCEDURE — 99232 SBSQ HOSP IP/OBS MODERATE 35: CPT

## 2025-01-27 RX ADMIN — Medication 5000 UNIT(S): at 10:34

## 2025-01-27 RX ADMIN — Medication 5000 UNIT(S): at 23:14

## 2025-01-27 RX ADMIN — Medication 250 MILLIGRAM(S): at 10:33

## 2025-01-27 RX ADMIN — Medication 250 MILLIGRAM(S): at 23:09

## 2025-01-27 RX ADMIN — Medication 5 MILLIGRAM(S): at 10:33

## 2025-01-27 RX ADMIN — QUETIAPINE FUMARATE 50 MILLIGRAM(S): 300 TABLET ORAL at 10:34

## 2025-01-27 RX ADMIN — ROSUVASTATIN CALCIUM 20 MILLIGRAM(S): 10 TABLET, FILM COATED ORAL at 23:09

## 2025-01-27 RX ADMIN — DONEPEZIL HYDROCHLORIDE 5 MILLIGRAM(S): 10 TABLET, FILM COATED ORAL at 23:09

## 2025-01-27 RX ADMIN — BUPROPION HYDROCHLORIDE 150 MILLIGRAM(S): 150 TABLET, EXTENDED RELEASE ORAL at 10:33

## 2025-01-27 NOTE — PROGRESS NOTE ADULT - ASSESSMENT
79-year-old female  history of cognitive impairment, hypertension, hyperlipidemia, right hip replacement at [place] (2019) presenting with an unwitnessed fall at home. Admitted for concern for syncope and right hip pain    # Syncope, rule out cardiogenic   - Tele  - TTE    2. Left ventricular cavity is normal in size. Left ventricular wall thickness is mildly increased. Left ventricular systolic function is hyperdynamic with an ejection fraction visually estimated at 70 to 75 %.   3. There is evidence of an intracavity gradient noted within the left ventricle in the setting of a hyperdynamic LV function.   4. There is mild (grade 1) leftventricular diastolic dysfunction.  - Orthostatic negative   - CT head negative   - Cardiology consult recommendations appreciated    Consider OP extended monitor    # Leukocytosis/cough   pt having cough for 2 weeks   CT chest Patchy groundglass opacities in the lingula and right lower   lobe are noted. Etiology is unclear.  UA suspicious for UTI   Follow up urine culture  Start PO Cipro- patient with penicillin and ceclor allergy.     # MICHELLE - unknown baseline   - s/p 1 L NS  Cr improved to 1.40    #hyponatremia  improved after iv fluids     # R Hip pain/ Hip fracture  - Comminuted fracture involving the sacrum and coccyx is which   is favored to be acute.  - orthopedics following   Discussed with ortho- no further surgical intervention at this time. #cognitive impairment  - cw Donepezil    #Hypertension,   - hold for soft bp  -now normotensive  -Restart Amlodipine  Continue to hold hctz for now     #hyperlipidemia  - cw crestor  #depression  - wellbutrin      Code status: Full  DVT ppx heparin       Dispo- for [facility] pending

## 2025-01-27 NOTE — PHYSICAL THERAPY INITIAL EVALUATION ADULT - MODALITIES TREATMENT COMMENTS
pt left in bed supine post Eval @ pt request; bed alarm on; HM intact; daughter Carol present; callbell in reach; pt instructed not to get up alone; call nursing for assist; sushma well; denied pain

## 2025-01-28 PROCEDURE — 99232 SBSQ HOSP IP/OBS MODERATE 35: CPT

## 2025-01-28 RX ADMIN — Medication 250 MILLIGRAM(S): at 21:50

## 2025-01-28 RX ADMIN — ROSUVASTATIN CALCIUM 20 MILLIGRAM(S): 10 TABLET, FILM COATED ORAL at 21:50

## 2025-01-28 RX ADMIN — QUETIAPINE FUMARATE 50 MILLIGRAM(S): 300 TABLET ORAL at 10:20

## 2025-01-28 RX ADMIN — Medication 250 MILLIGRAM(S): at 10:00

## 2025-01-28 RX ADMIN — Medication 5 MILLIGRAM(S): at 10:21

## 2025-01-28 RX ADMIN — BUPROPION HYDROCHLORIDE 150 MILLIGRAM(S): 150 TABLET, EXTENDED RELEASE ORAL at 10:00

## 2025-01-28 RX ADMIN — Medication 5000 UNIT(S): at 21:51

## 2025-01-28 RX ADMIN — Medication 5000 UNIT(S): at 10:21

## 2025-01-28 RX ADMIN — DONEPEZIL HYDROCHLORIDE 5 MILLIGRAM(S): 10 TABLET, FILM COATED ORAL at 21:50

## 2025-01-28 NOTE — CONSULT NOTE ADULT - SUBJECTIVE AND OBJECTIVE BOX
HPI:    HPI:  79-year-old female  history of cognitive impairment, hypertension, hyperlipidemia, right hip replacement at [place] (2019) presenting with an unwitnessed fall at home. Pt is a poor historian has no recollection of how she fell or the events afterward.    Was with  at home who heard the fall in the kitchen patient was found on her back.  She does not recall falling.  Admits to drinking alcohol today.  Complaining of right sided lower back/hip pain. No other complaints of pain.  Denies any palpitations, chest pain, shortness of breath, abdominal pain, urinary symptoms diarrhea or abdominal pain.  Denies fevers.  Patient was recently diagnosed with the flu 2 weeks ago her daugher states that at home she had low BP readings of 70s. However BP has been normal here. Imaging did show R sided trochanter fracture, ortho consulted.     PAST MEDICAL & SURGICAL HISTORY:  No pertinent past medical history        FAMILY HISTORY:    Social History:      Allergies    penicillins (Unknown)  Ceclor (Unknown)    Intolerances        MEDICATIONS  (STANDING):  heparin   Injectable 5000 Unit(s) SubCutaneous every 12 hours    MEDICATIONS  (PRN):  acetaminophen     Tablet .. 650 milliGRAM(s) Oral every 6 hours PRN Mild Pain (1 - 3)  albuterol/ipratropium for Nebulization 3 milliLiter(s) Nebulizer every 6 hours PRN Shortness of Breath and/or Wheezing  aluminum hydroxide/magnesium hydroxide/simethicone Suspension 30 milliLiter(s) Oral every 4 hours PRN Dyspepsia  melatonin 3 milliGRAM(s) Oral at bedtime PRN Insomnia  ondansetron Injectable 4 milliGRAM(s) IV Push every 8 hours PRN Nausea and/or Vomiting      ROS:  10 point ROS negative except for ones mentioned in HPI    PE   HEENT:  Head is normocephalic    Skin:  Warm and dry without any rash   NECK:  Supple without lymphadenopathy.   HEART:  Regular rate and rhythm. normal S1 and S2, No M/R/G  LUNGS:  diffuse ronchi  b/;  Abdomen examnondistended with good bowel sounds heard  EXTREMITIES:  Without cyanosis, clubbing or edema.   NEUROLOGICAL:  Gross nonfocal      PEx  T(C): 36.8 (01-24-25 @ 23:24), Max: 36.8 (01-24-25 @ 23:24)  HR: 73 (01-24-25 @ 23:24) (73 - 79)  BP: 108/55 (01-24-25 @ 23:24) (108/55 - 125/55)  RR: 17 (01-24-25 @ 23:24) (16 - 17)  SpO2: 92% (01-24-25 @ 23:24) (92% - 99%)  Wt(kg): --                        12.6   12.55 )-----------( 264      ( 24 Jan 2025 19:55 )             35.7     01-24    133[L]  |  102  |  51[H]  ----------------------------<  128[H]  4.5   |  25  |  1.87[H]    Ca    9.5      24 Jan 2025 19:55    TPro  7.7  /  Alb  3.6  /  TBili  0.7  /  DBili  x   /  AST  29  /  ALT  18  /  AlkPhos  84  01-24    CAPILLARY BLOOD GLUCOSE          Urinalysis Basic - ( 24 Jan 2025 19:55 )    Color: x / Appearance: x / SG: x / pH: x  Gluc: 128 mg/dL / Ketone: x  / Bili: x / Urobili: x   Blood: x / Protein: x / Nitrite: x   Leuk Esterase: x / RBC: x / WBC x   Sq Epi: x / Non Sq Epi: x / Bacteria: x                Radiology/Imaging, I have personally reviewed:        IMPRESSION:  No acute osseous abnormality.   (24 Jan 2025 23:48)      PAST MEDICAL & SURGICAL HISTORY:  No pertinent past medical history          Home Medications:  amLODIPine 5 mg oral tablet: 1 tab(s) orally once a day (24 Jan 2025 23:47)  buPROPion 150 mg/24 hours (XL) oral tablet, extended release: 1 tab(s) orally once a day (24 Jan 2025 23:47)  donepezil 5 mg oral tablet: 1 tab(s) orally once a day (24 Jan 2025 23:47)  hydroCHLOROthiazide 25 mg oral tablet: 1 tab(s) orally once a day (24 Jan 2025 23:47)  olmesartan 40 mg oral tablet: 1 tab(s) orally once a day (24 Jan 2025 23:47)  QUEtiapine 50 mg oral tablet: 1 tab(s) orally once a day (24 Jan 2025 23:48)  rosuvastatin 20 mg oral tablet: 1 tab(s) orally once a day (24 Jan 2025 23:47)      MEDICATIONS  (STANDING):  amLODIPine   Tablet 5 milliGRAM(s) Oral daily  buPROPion XL (24-Hour) . 150 milliGRAM(s) Oral daily  ciprofloxacin     Tablet 250 milliGRAM(s) Oral every 12 hours  donepezil 5 milliGRAM(s) Oral at bedtime  heparin   Injectable 5000 Unit(s) SubCutaneous every 12 hours  QUEtiapine 50 milliGRAM(s) Oral daily  rosuvastatin 20 milliGRAM(s) Oral at bedtime    MEDICATIONS  (PRN):  acetaminophen     Tablet .. 650 milliGRAM(s) Oral every 6 hours PRN Mild Pain (1 - 3)  albuterol/ipratropium for Nebulization 3 milliLiter(s) Nebulizer every 6 hours PRN Shortness of Breath and/or Wheezing  aluminum hydroxide/magnesium hydroxide/simethicone Suspension 30 milliLiter(s) Oral every 4 hours PRN Dyspepsia  melatonin 3 milliGRAM(s) Oral at bedtime PRN Insomnia  ondansetron Injectable 4 milliGRAM(s) IV Push every 8 hours PRN Nausea and/or Vomiting      Allergies    penicillins (Unknown)  Ceclor (Unknown)    Intolerances        SOCIAL HISTORY: Denies tobacco, etoh abuse or illicit drug use    FAMILY HISTORY:      Vital Signs Last 24 Hrs  T(C): 36.8 (28 Jan 2025 09:09), Max: 36.8 (28 Jan 2025 00:57)  T(F): 98.3 (28 Jan 2025 09:09), Max: 98.3 (28 Jan 2025 09:09)  HR: 75 (28 Jan 2025 09:09) (75 - 81)  BP: 120/58 (28 Jan 2025 09:09) (101/55 - 132/57)  BP(mean): --  RR: 18 (28 Jan 2025 09:09) (18 - 19)  SpO2: 98% (28 Jan 2025 09:09) (98% - 99%)    Parameters below as of 28 Jan 2025 09:09  Patient On (Oxygen Delivery Method): room air            REVIEW OF SYSTEMS:    CONSTITUTIONAL:  As per HPI.  HEENT:  Eyes:  No diplopia or blurred vision. ENT:  No earache, sore throat or runny nose.  CARDIOVASCULAR:  No pressure, squeezing, tightness, heaviness or aching about the chest, neck, axilla or epigastrium.  RESPIRATORY:  No cough, shortness of breath, PND or orthopnea.  GASTROINTESTINAL:  No nausea, vomiting or diarrhea.  GENITOURINARY:  No dysuria, frequency or urgency.  MUSCULOSKELETAL:  As per HPI.  SKIN:  No change in skin, hair or nails.  NEUROLOGIC:  No paresthesias, fasciculations, seizures or weakness.  PSYCHIATRIC:  No disorder of thought or mood.  ENDOCRINE:  No heat or cold intolerance, polyuria or polydipsia.  HEMATOLOGICAL:  No easy bruising or bleedings:  .     PHYSICAL EXAMINATION:    GENERAL APPEARANCE:  Pt. is not currently dyspneic, in no distress. Pt. is alert, oriented, and pleasant.  HEENT:  Pupils are normal and react normally. No icterus. Mucous membranes well colored.  NECK:  Supple. No lymphadenopathy. Jugular venous pressure not elevated. Carotids equal.   HEART:   The cardiac impulse has a normal quality. Regular. Normal S1 and S2. There are no murmurs, rubs or gallops noted  CHEST:  Chest is clear to auscultation. Normal respiratory effort.  ABDOMEN:  Soft and nontender.   EXTREMITIES:  There is no cyanosis, clubbing or edema.   SKIN:  No rash or significant lesions are noted.    LABS:                        11.5   8.47  )-----------( 257      ( 27 Jan 2025 06:55 )             34.4     01-27    137  |  110[H]  |  34[H]  ----------------------------<  102[H]  4.0   |  23  |  1.28    Ca    9.1      27 Jan 2025 06:55              Urinalysis Basic - ( 27 Jan 2025 06:55 )    Color: x / Appearance: x / SG: x / pH: x  Gluc: 102 mg/dL / Ketone: x  / Bili: x / Urobili: x   Blood: x / Protein: x / Nitrite: x   Leuk Esterase: x / RBC: x / WBC x   Sq Epi: x / Non Sq Epi: x / Bacteria: x            RADIOLOGY & ADDITIONAL STUDIES:        HPI:    79-year-old female history of cognitive impairment, hypertension, hyperlipidemia, right hip replacement at [facility] (2019) presenting with an unwitnessed fall at home. Pt is a poor historian has no recollection of how she fell or the events afterward. Was with  at home who heard the fall in the kitchen patient was found on her back.  She does not recall falling.  Admits to drinking alcohol today.  Complaining of right sided lower back/hip pain. No other complaints of pain.  Denies any palpitations, chest pain, shortness of breath, abdominal pain, urinary symptoms diarrhea or abdominal pain.  Denies fevers.  Patient was recently diagnosed with the flu 2 weeks ago her daugher states that at home she had low BP readings of 70s. However BP has been normal here. Imaging did show R sided trochanter fracture, ortho consulted. pat seen for pulmonary eval for abnormal CT chest. < from: CT Chest No Cont-IMPRESSION: Patchy groundglass opacities in the lingula and right lower lobe are noted. Etiology is unclear. H/o smoking until feeling sick.      PAST MEDICAL & SURGICAL HISTORY:  No pertinent past medical history    FAMILY HISTORY:    Social History:      Allergies    penicillins (Unknown)  Ceclor (Unknown)    Intolerances        MEDICATIONS  (STANDING):  heparin   Injectable 5000 Unit(s) SubCutaneous every 12 hours    MEDICATIONS  (PRN):  acetaminophen     Tablet .. 650 milliGRAM(s) Oral every 6 hours PRN Mild Pain (1 - 3)  albuterol/ipratropium for Nebulization 3 milliLiter(s) Nebulizer every 6 hours PRN Shortness of Breath and/or Wheezing  aluminum hydroxide/magnesium hydroxide/simethicone Suspension 30 milliLiter(s) Oral every 4 hours PRN Dyspepsia  melatonin 3 milliGRAM(s) Oral at bedtime PRN Insomnia  ondansetron Injectable 4 milliGRAM(s) IV Push every 8 hours PRN Nausea and/or Vomiting      ROS:  10 point ROS negative except for ones mentioned in HPI    PE   HEENT:  Head is normocephalic    Skin:  Warm and dry without any rash   NECK:  Supple without lymphadenopathy.   HEART:  Regular rate and rhythm. normal S1 and S2, No M/R/G  LUNGS:  diffuse ronchi  b/;  Abdomen examnondistended with good bowel sounds heard  EXTREMITIES:  Without cyanosis, clubbing or edema.   NEUROLOGICAL:  Gross nonfocal      PEx  T(C): 36.8 (01-24-25 @ 23:24), Max: 36.8 (01-24-25 @ 23:24)  HR: 73 (01-24-25 @ 23:24) (73 - 79)  BP: 108/55 (01-24-25 @ 23:24) (108/55 - 125/55)  RR: 17 (01-24-25 @ 23:24) (16 - 17)  SpO2: 92% (01-24-25 @ 23:24) (92% - 99%)  Wt(kg): --                        12.6   12.55 )-----------( 264      ( 24 Jan 2025 19:55 )             35.7     01-24    133[L]  |  102  |  51[H]  ----------------------------<  128[H]  4.5   |  25  |  1.87[H]    Ca    9.5      24 Jan 2025 19:55    TPro  7.7  /  Alb  3.6  /  TBili  0.7  /  DBili  x   /  AST  29  /  ALT  18  /  AlkPhos  84  01-24    CAPILLARY BLOOD GLUCOSE          Urinalysis Basic - ( 24 Jan 2025 19:55 )    Color: x / Appearance: x / SG: x / pH: x  Gluc: 128 mg/dL / Ketone: x  / Bili: x / Urobili: x   Blood: x / Protein: x / Nitrite: x   Leuk Esterase: x / RBC: x / WBC x   Sq Epi: x / Non Sq Epi: x / Bacteria: x                Radiology/Imaging, I have personally reviewed:        IMPRESSION:  No acute osseous abnormality.   (24 Jan 2025 23:48)      PAST MEDICAL & SURGICAL HISTORY:  No pertinent past medical history          Home Medications:  amLODIPine 5 mg oral tablet: 1 tab(s) orally once a day (24 Jan 2025 23:47)  buPROPion 150 mg/24 hours (XL) oral tablet, extended release: 1 tab(s) orally once a day (24 Jan 2025 23:47)  donepezil 5 mg oral tablet: 1 tab(s) orally once a day (24 Jan 2025 23:47)  hydroCHLOROthiazide 25 mg oral tablet: 1 tab(s) orally once a day (24 Jan 2025 23:47)  olmesartan 40 mg oral tablet: 1 tab(s) orally once a day (24 Jan 2025 23:47)  QUEtiapine 50 mg oral tablet: 1 tab(s) orally once a day (24 Jan 2025 23:48)  rosuvastatin 20 mg oral tablet: 1 tab(s) orally once a day (24 Jan 2025 23:47)      MEDICATIONS  (STANDING):  amLODIPine   Tablet 5 milliGRAM(s) Oral daily  buPROPion XL (24-Hour) . 150 milliGRAM(s) Oral daily  ciprofloxacin     Tablet 250 milliGRAM(s) Oral every 12 hours  donepezil 5 milliGRAM(s) Oral at bedtime  heparin   Injectable 5000 Unit(s) SubCutaneous every 12 hours  QUEtiapine 50 milliGRAM(s) Oral daily  rosuvastatin 20 milliGRAM(s) Oral at bedtime    MEDICATIONS  (PRN):  acetaminophen     Tablet .. 650 milliGRAM(s) Oral every 6 hours PRN Mild Pain (1 - 3)  albuterol/ipratropium for Nebulization 3 milliLiter(s) Nebulizer every 6 hours PRN Shortness of Breath and/or Wheezing  aluminum hydroxide/magnesium hydroxide/simethicone Suspension 30 milliLiter(s) Oral every 4 hours PRN Dyspepsia  melatonin 3 milliGRAM(s) Oral at bedtime PRN Insomnia  ondansetron Injectable 4 milliGRAM(s) IV Push every 8 hours PRN Nausea and/or Vomiting      Allergies    penicillins (Unknown)  Ceclor (Unknown)    Intolerances        SOCIAL HISTORY: Denies tobacco, etoh abuse or illicit drug use    FAMILY HISTORY:      Vital Signs Last 24 Hrs  T(C): 36.8 (28 Jan 2025 09:09), Max: 36.8 (28 Jan 2025 00:57)  T(F): 98.3 (28 Jan 2025 09:09), Max: 98.3 (28 Jan 2025 09:09)  HR: 75 (28 Jan 2025 09:09) (75 - 81)  BP: 120/58 (28 Jan 2025 09:09) (101/55 - 132/57)  BP(mean): --  RR: 18 (28 Jan 2025 09:09) (18 - 19)  SpO2: 98% (28 Jan 2025 09:09) (98% - 99%)    Parameters below as of 28 Jan 2025 09:09  Patient On (Oxygen Delivery Method): room air            REVIEW OF SYSTEMS:    CONSTITUTIONAL:  As per HPI.  HEENT:  Eyes:  No diplopia or blurred vision. ENT:  No earache, sore throat or runny nose.  CARDIOVASCULAR:  No pressure, squeezing, tightness, heaviness or aching about the chest, neck, axilla or epigastrium.  RESPIRATORY:  No cough, shortness of breath, PND or orthopnea.  GASTROINTESTINAL:  No nausea, vomiting or diarrhea.  GENITOURINARY:  No dysuria, frequency or urgency.  MUSCULOSKELETAL:  As per HPI.  SKIN:  No change in skin, hair or nails.  NEUROLOGIC:  No paresthesias, fasciculations, seizures or weakness.  PSYCHIATRIC:  No disorder of thought or mood.  ENDOCRINE:  No heat or cold intolerance, polyuria or polydipsia.  HEMATOLOGICAL:  No easy bruising or bleedings:  .     PHYSICAL EXAMINATION:    GENERAL APPEARANCE:  Pt. is not currently dyspneic, in no distress. Pt. is alert, oriented, and pleasant.  HEENT:  Pupils are normal and react normally. No icterus. Mucous membranes well colored.  NECK:  Supple. No lymphadenopathy. Jugular venous pressure not elevated. Carotids equal.   HEART:   The cardiac impulse has a normal quality. Regular. Normal S1 and S2. There are no murmurs, rubs or gallops noted  CHEST:  Chest crackes to auscultation. Normal respiratory effort.  ABDOMEN:  Soft and nontender.   EXTREMITIES:  There is no cyanosis, clubbing or edema.   SKIN:  No rash or significant lesions are noted.    LABS:                        11.5   8.47  )-----------( 257      ( 27 Jan 2025 06:55 )             34.4     01-27    137  |  110[H]  |  34[H]  ----------------------------<  102[H]  4.0   |  23  |  1.28    Ca    9.1      27 Jan 2025 06:55              Urinalysis Basic - ( 27 Jan 2025 06:55 )    Color: x / Appearance: x / SG: x / pH: x  Gluc: 102 mg/dL / Ketone: x  / Bili: x / Urobili: x   Blood: x / Protein: x / Nitrite: x   Leuk Esterase: x / RBC: x / WBC x   Sq Epi: x / Non Sq Epi: x / Bacteria: x            RADIOLOGY & ADDITIONAL STUDIES:       CT Chest No Cont (01.24.25 @ 23:48) >  IMPRESSION: Patchy groundglass opacities in the lingula and right lower   lobe are noted. Etiology is unclear.

## 2025-01-28 NOTE — PROGRESS NOTE ADULT - ASSESSMENT
79-year-old female  history of cognitive impairment, hypertension, hyperlipidemia, right hip replacement at [place] (2019) presenting with an unwitnessed fall at home. Admitted for concern for syncope and right hip pain    # Syncope, rule out cardiogenic   - Tele  - TTE    2. Left ventricular cavity is normal in size. Left ventricular wall thickness is mildly increased. Left ventricular systolic function is hyperdynamic with an ejection fraction visually estimated at 70 to 75 %.   3. There is evidence of an intracavity gradient noted within the left ventricle in the setting of a hyperdynamic LV function.   4. There is mild (grade 1) leftventricular diastolic dysfunction.  - Orthostatic negative   - CT head negative   - Cardiology consult recommendations appreciated    Consider OP extended monitor    # Leukocytosis/cough   pt having cough for 2 weeks CT chest Patchy groundglass opacities in the lingula and right lower   lobe are noted. Etiology is unclear.  UA suspicious for UTI   Follow up urine culture  Start PO Cipro- patient with penicillin and ceclor allergy.     # MICHELLE - unknown baseline   - s/p 1 L NS  Cr improved to 1.40    #hyponatremia  improved after iv fluids     # R Hip pain/ Hip fracture  - Comminuted fracture involving the sacrum and coccyx is which   is favored to be acute.  - orthopedics following   Discussed with ortho- no further surgical intervention at this time. #cognitive impairment  - cw Donepezil    #Hypertension,   - hold for soft bp  -now normotensive  -Restart AmlodipineContinue to hold hctz for now     #hyperlipidemia  - cw crestor  #depression  - wellbutrin      Code status: Full  DVT ppx heparin       Dispo- for [facility]I pending

## 2025-01-28 NOTE — CONSULT NOTE ADULT - ASSESSMENT
79-year-old female  history of cognitive impairment, hypertension, hyperlipidemia, right hip replacement at [place] (2019) presenting with an unwitnessed fall at home. Admitted for concern for syncope and right hip pain    problems:    Syncope, rule out cardiogenic/TTE- Left ventricular cavity is normal in size. Left ventricular wall thickness is mildly increased. Left ventricular systolic function is hyperdynamic with an ejection fraction visually estimated at 70 to 75 %.  There is evidence of an intracavity gradient noted within the left ventricle in the setting of a hyperdynamic LV function. There is mild (grade 1) leftventricular diastolic dysfunction  Leukocytosis/cough-pt having cough for 2 weeks-CT chest Patchy groundglass opacities in the lingula and right lower lobe are noted. Etiology is unclear.  UA suspicious for UT  MICHELLE - unknown baseline   R Hip pain/ Hip fracture- Comminuted fracture involving the sacrum and coccyx is which is favored to be acute  Hypertension  Hyperlipidemia  Depression    PLAN:    Orthostatic negative/CT head negative   Leukocytosis/cough/pt having cough for 2 weeks-CT chest Patchy groundglass opacities in the lingula and right lower lobe are noted. Etiology is unclear. REPEAT 3 MONTHS  UA suspicious for UTI- PO Cipro  S/p 1 L NS-Cr improved to 1.40  D/W PAT & DR Pagan  DVT ppx heparin

## 2025-01-29 PROCEDURE — 99232 SBSQ HOSP IP/OBS MODERATE 35: CPT

## 2025-01-29 RX ORDER — CIPROFLOXACIN HCL 500 MG
250 TABLET ORAL ONCE
Refills: 0 | Status: COMPLETED | OUTPATIENT
Start: 2025-01-29 | End: 2025-01-29

## 2025-01-29 RX ADMIN — DONEPEZIL HYDROCHLORIDE 5 MILLIGRAM(S): 10 TABLET, FILM COATED ORAL at 21:34

## 2025-01-29 RX ADMIN — ROSUVASTATIN CALCIUM 20 MILLIGRAM(S): 10 TABLET, FILM COATED ORAL at 21:34

## 2025-01-29 RX ADMIN — Medication 5000 UNIT(S): at 09:36

## 2025-01-29 RX ADMIN — Medication 250 MILLIGRAM(S): at 20:12

## 2025-01-29 RX ADMIN — Medication 5000 UNIT(S): at 21:35

## 2025-01-29 RX ADMIN — BUPROPION HYDROCHLORIDE 150 MILLIGRAM(S): 150 TABLET, EXTENDED RELEASE ORAL at 09:36

## 2025-01-29 RX ADMIN — QUETIAPINE FUMARATE 50 MILLIGRAM(S): 300 TABLET ORAL at 09:36

## 2025-01-29 RX ADMIN — Medication 5 MILLIGRAM(S): at 09:35

## 2025-01-29 RX ADMIN — Medication 250 MILLIGRAM(S): at 09:35

## 2025-01-29 NOTE — PROGRESS NOTE ADULT - ASSESSMENT
79-year-old female  history of cognitive impairment, hypertension, hyperlipidemia, right hip replacement at [place] (2019) presenting with an unwitnessed fall at home. Admitted for concern for syncope and right hip pain    # Syncope, rule out cardiogenic   - Tele  - TTE    2. Left ventricular cavity is normal in size. Left ventricular wall thickness is mildly increased. Left ventricular systolic function is hyperdynamic with an ejection fraction visually estimated at 70 to 75 %.   3. There is evidence of an intracavity gradient noted within the left ventricle in the setting of a hyperdynamic LV function.   4. There is mild (grade 1) leftventricular diastolic dysfunction.  - Orthostatic negative   - CT head negative   - Cardiology consult recommendations appreciated    Consider OP extended monitor    # Leukocytosis/cough pt having cough for 2 weeks CT chest Patchy groundglass opacities in the lingula and right lower   lobe are noted. Etiology is unclear.  UA suspicious for UTI   Follow up urine culture  Start PO Cipro- patient with penicillin and ceclor allergy.     # MICHELLE - unknown baseline   - s/p 1 L NS  Cr improved to 1.40    #hyponatremia  improved after iv fluids     # R Hip pain/ Hip fracture  - Comminuted fracture involving the sacrum and coccyx is which   is favored to be acute.  - orthopedics following   Discussed with ortho- no further surgical intervention at this time. #cognitive impairment  - cw Donepezil    #Hypertension,   - hold for soft bp  -now normotensive  -Restart AmlodipineContinue to hold hctz for now #hyperlipidemia  - cw crestor  #depression  - wellbutrin      Code status: Full  DVT ppx heparin       Dispo- for MAI pending auth

## 2025-01-29 NOTE — PROGRESS NOTE ADULT - ASSESSMENT
79-year-old female  history of cognitive impairment, hypertension, hyperlipidemia, right hip replacement at [place] (2019) presenting with an unwitnessed fall at home. Admitted for concern for syncope and right hip pain    problems:    Syncope, rule out cardiogenic/TTE- Left ventricular cavity is normal in size. Left ventricular wall thickness is mildly increased. Left ventricular systolic function is hyperdynamic with an ejection fraction visually estimated at 70 to 75 %.  There is evidence of an intracavity gradient noted within the left ventricle in the setting of a hyperdynamic LV function. There is mild (grade 1) leftventricular diastolic dysfunction  Leukocytosis/cough-pt having cough for 2 weeks-CT chest Patchy groundglass opacities in the lingula and right lower lobe are noted. Etiology is unclear.  UA suspicious for UT  MICHELLE - unknown baseline   R Hip pain/ Hip fracture- Comminuted fracture involving the sacrum and coccyx is which is favored to be acute  Hypertension  Hyperlipidemia  Depression    PLAN:    pulmonary better-decd planning to fu in the office  Orthostatic negative/CT head negative   Leukocytosis/cough/pt having cough for 2 weeks-CT chest Patchy groundglass opacities in the lingula and right lower lobe are noted. Etiology is unclear. REPEAT 3 MONTHS  UA suspicious for UTI- PO Cipro  S/p 1 L NS-Cr improved to 1.40  D/W PAT & DR Pagan  DVT ppx heparin

## 2025-01-30 ENCOUNTER — TRANSCRIPTION ENCOUNTER (OUTPATIENT)
Age: 80
End: 2025-01-30

## 2025-01-30 VITALS
OXYGEN SATURATION: 98 % | RESPIRATION RATE: 18 BRPM | HEART RATE: 66 BPM | DIASTOLIC BLOOD PRESSURE: 54 MMHG | TEMPERATURE: 97 F | SYSTOLIC BLOOD PRESSURE: 113 MMHG

## 2025-01-30 PROCEDURE — 99239 HOSP IP/OBS DSCHRG MGMT >30: CPT

## 2025-01-30 RX ORDER — OLMESARTAN MEDOXOMIL 20 MG/1
1 TABLET, FILM COATED ORAL
Refills: 0 | DISCHARGE

## 2025-01-30 RX ORDER — HYDROCHLOROTHIAZIDE 50 MG
1 TABLET ORAL
Refills: 0 | DISCHARGE

## 2025-01-30 RX ADMIN — Medication 5000 UNIT(S): at 09:16

## 2025-01-30 RX ADMIN — Medication 5 MILLIGRAM(S): at 09:14

## 2025-01-30 RX ADMIN — QUETIAPINE FUMARATE 50 MILLIGRAM(S): 300 TABLET ORAL at 09:14

## 2025-01-30 RX ADMIN — BUPROPION HYDROCHLORIDE 150 MILLIGRAM(S): 150 TABLET, EXTENDED RELEASE ORAL at 09:16

## 2025-01-30 NOTE — DISCHARGE NOTE NURSING/CASE MANAGEMENT/SOCIAL WORK - NSDCPEFALRISK_GEN_ALL_CORE
For information on Fall & Injury Prevention, visit: [email or web address] OR  [email or web address] OR  https://www.cdc.gov/steadi/patient.html

## 2025-01-30 NOTE — DISCHARGE NOTE PROVIDER - HOSPITAL COURSE
79-year-old female  history of cognitive impairment, hypertension, hyperlipidemia, right hip replacement at [place] (2019) presenting with an unwitnessed fall at home. Admitted for concern for syncope and right hip pain      # Syncope, rule out cardiogenic   CT head negative  ECG NSR without evidence of ischemic changes or conduction disease  .  No events noted on tele.  Consider OP extended monitor  echo showed hyperdynamic LVF, EF 70-75%, Mild-Mod LVH, Mild MR  .   orthostatics negative.   2. Left ventricular cavity is normal in size. Left ventricular wall thickness is mildly increased. Left ventricular systolic function is hyperdynamic with an ejection fraction visually estimated at 70 to 75 %.   3. There is evidence of an intracavity gradient noted within the left ventricle in the setting of a hyperdynamic LV function.   4. There is mild (grade 1) leftventricular diastolic dysfunction.  - Orthostatic negative   - CT head negative   - Cardiology consult recommendations appreciated   f/u cardiology as outpatient for outpatient  cardiac monitor     # Leukocytosis/cough  resolved - unlikely acute PNA  REPEAT CT chest in  3 MONTHS  pt having cough for 2 weeks CT chest Patchy groundglass opacities in the lingula and right lower lobe are noted. Etiology is unclear.- repeat ct chest in 3 months- I dw pulm  UA suspicious for UTI  , Ucx NGTD- treated with 3 days of ciprofloxacin  s/p  PO Cipro- patient with penicillin and ceclor allergy.     # MICHELLE  resolved - unknown baseline   - s/p 1 L NS  Cr improved to 1.40    #hyponatremia  improved after iv fluids     # R Hip pain/ Hip fracture  - Comminuted fracture involving the sacrum and coccyx is which   is favored to be acute.  - orthopedics following   Discussed with ortho- no further surgical intervention at this time.       #cognitive impairment  - cw Donepezil    #Hypertension,   - hold for soft bp  -now normotensive  -Restart Amlodipine  Continue to hold hctz for now       #hyperlipidemia  - cw crestor  #depression  - wellbutrin  Code status: Full  DVT ppx heparin       Dispo- for [facility] pending auth      PHYSICAL EXAM:  HEENT:  Head is normocephalic    Skin:  Warm and dry without any rash   NECK:  Supple without lymphadenopathy.   HEART:  Regular rate and rhythm. normal S1 and S2, No M/R/G  LUNGS:  CTA B/L   Abdomen exam-nondistended with good bowel sounds heard  EXTREMITIES:  Without cyanosis, clubbing or edema.   NEUROLOGICAL:  Gross nonfocal    discharge time 55mins

## 2025-01-30 NOTE — DISCHARGE NOTE PROVIDER - PROVIDER TOKENS
PROVIDER:[TOKEN:[[id number]]],PROVIDER:[TOKEN:[[id number]]],PROVIDER:[TOKEN:[[id number]]]

## 2025-01-30 NOTE — DISCHARGE NOTE NURSING/CASE MANAGEMENT/SOCIAL WORK - FINANCIAL ASSISTANCE
[facility] provides services at a reduced cost to those who are determined to be eligible through [facility]’s financial assistance program. Information regarding [facility]’s financial assistance program can be found by going to [email or web address] or by calling [phone].

## 2025-01-30 NOTE — DISCHARGE NOTE PROVIDER - NSDCMRMEDTOKEN_GEN_ALL_CORE_FT
amLODIPine 5 mg oral tablet: 1 tab(s) orally once a day  buPROPion 150 mg/24 hours (XL) oral tablet, extended release: 1 tab(s) orally once a day  donepezil 5 mg oral tablet: 1 tab(s) orally once a day  QUEtiapine 50 mg oral tablet: 1 tab(s) orally once a day  rosuvastatin 20 mg oral tablet: 1 tab(s) orally once a day

## 2025-01-30 NOTE — DISCHARGE NOTE PROVIDER - CARE PROVIDERS DIRECT ADDRESSES
,[email or web address]

## 2025-01-30 NOTE — DISCHARGE NOTE NURSING/CASE MANAGEMENT/SOCIAL WORK - PATIENT PORTAL LINK FT
You can access the FollowMyHealth Patient Portal offered by [facility] by registering at the following website: [email or web address]. By joining [facility]’s FollowMyHealth portal, you will also be able to view your health information using other applications (apps) compatible with our system.

## 2025-01-30 NOTE — DISCHARGE NOTE PROVIDER - NSDCCPCAREPLAN_GEN_ALL_CORE_FT
PRINCIPAL DISCHARGE DIAGNOSIS  Diagnosis: Syncope  Assessment and Plan of Treatment: Your olmesartan and hydrochlorothiazide have been temorarily stopped  to avoid low Blood pressure   follow up with cardiology in 1 to 2 weeks for repeat blood pressure check to consider resuming these meds if needed as outpatient and you would need outpatient cardiac monitor to be ordered by your cardiologist in office   Follow up with orthopedist as outpatient for follow up on pelvic fracture  Follow up with pulmonology  in 2 to 3 weeks to evaluate lung opacity and nodule monitoring as outpatient - for referal to repeat CT chest as outpatient      SECONDARY DISCHARGE DIAGNOSES  Diagnosis: Fracture of lesser trochanter of femur  Assessment and Plan of Treatment:

## 2025-01-30 NOTE — DISCHARGE NOTE PROVIDER - CARE PROVIDER_API CALL
Aye Jonas  Cardiology  [address] [place] [address]  Phone: [phone]  Fax: [phone]  Follow Up Time:     Nadeem Joyner  Pulmonary Disease  [address] [place] [address]  Phone: [phone]  Fax: [phone]  Follow Up Time:     Chapito Sosa  Orthopaedic Sports Medicine  [address], [address]  [place] [address]  Phone: [phone]  Fax: [phone]  Follow Up Time:

## 2025-01-30 NOTE — PROGRESS NOTE ADULT - ASSESSMENT
79-year-old female  history of cognitive impairment, hypertension, hyperlipidemia, right hip replacement at [place] (2019) presenting with an unwitnessed fall at home. Admitted for concern for syncope and right hip pain    problems:    Syncope, rule out cardiogenic/TTE- Left ventricular cavity is normal in size. Left ventricular wall thickness is mildly increased. Left ventricular systolic function is hyperdynamic with an ejection fraction visually estimated at 70 to 75 %.  There is evidence of an intracavity gradient noted within the left ventricle in the setting of a hyperdynamic LV function. There is mild (grade 1) leftventricular diastolic dysfunction  Leukocytosis/cough-pt having cough for 2 weeks-CT chest Patchy groundglass opacities in the lingula and right lower lobe are noted. Etiology is unclear.  UA suspicious for UT  MICHELLE - unknown baseline   R Hip pain/ Hip fracture- Comminuted fracture involving the sacrum and coccyx is which is favored to be acute  Hypertension  Hyperlipidemia  Depression    PLAN:    Pulmonary better-decd planning to fu in the office  Orthostatic negative/CT head negative   Leukocytosis/cough/pt having cough for 2 weeks-CT chest Patchy groundglass opacities in the lingula and right lower lobe are noted. Etiology is unclear. REPEAT 3 MONTHS  UA suspicious for UTI- PO Cipro  S/p 1 L NS-Cr improved to 1.40  D/W PAT & DR Pagan  DVT ppx heparin

## 2025-02-06 DIAGNOSIS — R91.8 OTHER NONSPECIFIC ABNORMAL FINDING OF LUNG FIELD: ICD-10-CM

## 2025-02-06 DIAGNOSIS — Z88.0 ALLERGY STATUS TO PENICILLIN: ICD-10-CM

## 2025-02-06 DIAGNOSIS — E87.1 HYPO-OSMOLALITY AND HYPONATREMIA: ICD-10-CM

## 2025-02-06 DIAGNOSIS — R55 SYNCOPE AND COLLAPSE: ICD-10-CM

## 2025-02-06 DIAGNOSIS — S32.10XA UNSPECIFIED FRACTURE OF SACRUM, INITIAL ENCOUNTER FOR CLOSED FRACTURE: ICD-10-CM

## 2025-02-06 DIAGNOSIS — F32.A DEPRESSION, UNSPECIFIED: ICD-10-CM

## 2025-02-06 DIAGNOSIS — S32.2XXA FRACTURE OF COCCYX, INITIAL ENCOUNTER FOR CLOSED FRACTURE: ICD-10-CM

## 2025-02-06 DIAGNOSIS — N17.9 ACUTE KIDNEY FAILURE, UNSPECIFIED: ICD-10-CM

## 2025-02-06 DIAGNOSIS — R05.9 COUGH, UNSPECIFIED: ICD-10-CM
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
## 2025-02-06 DIAGNOSIS — Z88.1 ALLERGY STATUS TO OTHER ANTIBIOTIC AGENTS: ICD-10-CM
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
## 2025-02-06 DIAGNOSIS — I34.0 NONRHEUMATIC MITRAL (VALVE) INSUFFICIENCY: ICD-10-CM

## 2025-02-06 DIAGNOSIS — Z87.891 PERSONAL HISTORY OF NICOTINE DEPENDENCE: ICD-10-CM

## 2025-02-06 DIAGNOSIS — Z96.641 PRESENCE OF RIGHT ARTIFICIAL HIP JOINT: ICD-10-CM

## 2025-02-06 DIAGNOSIS — F09 UNSPECIFIED MENTAL DISORDER DUE TO KNOWN PHYSIOLOGICAL CONDITION: ICD-10-CM
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
## 2025-02-06 DIAGNOSIS — E78.5 HYPERLIPIDEMIA, UNSPECIFIED: ICD-10-CM

## 2025-02-06 DIAGNOSIS — I10 ESSENTIAL (PRIMARY) HYPERTENSION: ICD-10-CM

## 2025-02-06 DIAGNOSIS — N39.0 URINARY TRACT INFECTION, SITE NOT SPECIFIED: ICD-10-CM

## 2025-02-06 DIAGNOSIS — Z87.81 PERSONAL HISTORY OF (HEALED) TRAUMATIC FRACTURE: ICD-10-CM

## 2025-03-10 NOTE — ED PROVIDER NOTE - PMH
March 10, 2025      [facility]  [address] [address] [address]  Phone: [phone]  Fax: [phone]       Patient: Dianelys Larose   YOB: 1985  Date of Visit: 03/10/2025    To Whom It May Concern:    Rose Larose  was at [facility] on 03/10/2025. The patient may return to work/school on 03/10/2025 with no restrictions. If you have any questions or concerns, or if I can be of further assistance, please do not hesitate to contact me.    Sincerely,    Manisha Alejo MA      No pertinent past medical history